# Patient Record
Sex: FEMALE | Race: BLACK OR AFRICAN AMERICAN | NOT HISPANIC OR LATINO | Employment: FULL TIME | URBAN - METROPOLITAN AREA
[De-identification: names, ages, dates, MRNs, and addresses within clinical notes are randomized per-mention and may not be internally consistent; named-entity substitution may affect disease eponyms.]

---

## 2023-06-06 LAB
ANION GAP IN SER/PLAS: 15 MMOL/L (ref 10–20)
APPEARANCE, URINE: CLEAR
BILIRUBIN, URINE: NEGATIVE
BLOOD, URINE: ABNORMAL
C REACTIVE PROTEIN (MG/L) IN SER/PLAS: 0.38 MG/DL
CALCIUM (MG/DL) IN SER/PLAS: 10.1 MG/DL (ref 8.6–10.6)
CARBON DIOXIDE, TOTAL (MMOL/L) IN SER/PLAS: 29 MMOL/L (ref 21–32)
CHLORIDE (MMOL/L) IN SER/PLAS: 99 MMOL/L (ref 98–107)
COLOR, URINE: YELLOW
CORTISOL (UG/DL) IN SERUM: 7.3 UG/DL (ref 2.5–20)
CREATININE (MG/DL) IN SER/PLAS: 0.6 MG/DL (ref 0.5–1.05)
GFR FEMALE: >90 ML/MIN/1.73M2
GLUCOSE (MG/DL) IN SER/PLAS: 106 MG/DL (ref 74–99)
GLUCOSE, URINE: NEGATIVE MG/DL
KETONES, URINE: NEGATIVE MG/DL
LEUKOCYTE ESTERASE, URINE: NEGATIVE
NITRITE, URINE: NEGATIVE
PH, URINE: 6 (ref 5–8)
POTASSIUM (MMOL/L) IN SER/PLAS: 3.7 MMOL/L (ref 3.5–5.3)
PROTEIN, URINE: NEGATIVE MG/DL
RBC, URINE: <1 /HPF (ref 0–5)
RHEUMATOID FACTOR (IU/ML) IN SERUM OR PLASMA: <10 IU/ML (ref 0–15)
SODIUM (MMOL/L) IN SER/PLAS: 139 MMOL/L (ref 136–145)
SPECIFIC GRAVITY, URINE: 1 (ref 1–1.03)
SQUAMOUS EPITHELIAL CELLS, URINE: 1 /HPF
UREA NITROGEN (MG/DL) IN SER/PLAS: 6 MG/DL (ref 6–23)
UROBILINOGEN, URINE: <2 MG/DL (ref 0–1.9)
WBC, URINE: 1 /HPF (ref 0–5)

## 2023-06-08 LAB
ALBUMIN ELP: 4.7 G/DL (ref 3.4–5)
ALPHA 1: 0.3 G/DL (ref 0.2–0.6)
ALPHA 2: 0.6 G/DL (ref 0.4–1.1)
BETA: 1.1 G/DL (ref 0.5–1.2)
GAMMA GLOBULIN: 1.1 G/DL (ref 0.5–1.4)
PATH REVIEW-SERUM PROTEIN ELECTROPHORESIS: NORMAL
PROTEIN ELECTROPHORESIS INTERPRETATION: NORMAL
PROTEIN TOTAL: 7.7 G/DL (ref 6.4–8.2)

## 2023-06-09 DIAGNOSIS — I10 HYPERTENSION, UNSPECIFIED TYPE: Primary | ICD-10-CM

## 2023-06-09 LAB — ANGIOTENSIN CONVERTING ENZYME: 34 U/L (ref 16–85)

## 2023-06-15 RX ORDER — DILTIAZEM HYDROCHLORIDE 240 MG/1
CAPSULE, EXTENDED RELEASE ORAL
Qty: 90 CAPSULE | Refills: 1 | Status: SHIPPED | OUTPATIENT
Start: 2023-06-15 | End: 2023-11-07 | Stop reason: SDUPTHER

## 2023-08-18 ENCOUNTER — OFFICE VISIT (OUTPATIENT)
Dept: PRIMARY CARE | Facility: CLINIC | Age: 53
End: 2023-08-18
Payer: COMMERCIAL

## 2023-08-18 VITALS
DIASTOLIC BLOOD PRESSURE: 92 MMHG | OXYGEN SATURATION: 97 % | TEMPERATURE: 97.6 F | RESPIRATION RATE: 15 BRPM | SYSTOLIC BLOOD PRESSURE: 154 MMHG | BODY MASS INDEX: 38.01 KG/M2 | HEIGHT: 66 IN | HEART RATE: 77 BPM | WEIGHT: 236.5 LBS

## 2023-08-18 DIAGNOSIS — I10 PRIMARY HYPERTENSION: ICD-10-CM

## 2023-08-18 DIAGNOSIS — H53.8 BLURRED VISION: Primary | ICD-10-CM

## 2023-08-18 DIAGNOSIS — Z12.31 ENCOUNTER FOR SCREENING MAMMOGRAM FOR BREAST CANCER: ICD-10-CM

## 2023-08-18 DIAGNOSIS — E66.01 CLASS 2 SEVERE OBESITY WITH SERIOUS COMORBIDITY AND BODY MASS INDEX (BMI) OF 38.0 TO 38.9 IN ADULT, UNSPECIFIED OBESITY TYPE (MULTI): ICD-10-CM

## 2023-08-18 DIAGNOSIS — R51.9 INTRACTABLE HEADACHE, UNSPECIFIED CHRONICITY PATTERN, UNSPECIFIED HEADACHE TYPE: ICD-10-CM

## 2023-08-18 DIAGNOSIS — Z12.11 ENCOUNTER FOR SCREENING FOR MALIGNANT NEOPLASM OF COLON: ICD-10-CM

## 2023-08-18 DIAGNOSIS — R53.83 FATIGUE, UNSPECIFIED TYPE: ICD-10-CM

## 2023-08-18 DIAGNOSIS — E55.9 VITAMIN D DEFICIENCY: ICD-10-CM

## 2023-08-18 DIAGNOSIS — F41.9 ANXIETY: ICD-10-CM

## 2023-08-18 DIAGNOSIS — H53.50 VISUAL COLOR CHANGES: ICD-10-CM

## 2023-08-18 DIAGNOSIS — R63.1 INCREASED THIRST: ICD-10-CM

## 2023-08-18 DIAGNOSIS — E53.9 VITAMIN B DEFICIENCY: ICD-10-CM

## 2023-08-18 PROBLEM — M25.531 PAIN IN BOTH WRISTS: Status: ACTIVE | Noted: 2023-08-18

## 2023-08-18 PROBLEM — G45.9 TIA (TRANSIENT ISCHEMIC ATTACK): Status: ACTIVE | Noted: 2023-08-18

## 2023-08-18 PROBLEM — R90.89 ABNORMAL BRAIN MRI: Status: ACTIVE | Noted: 2023-08-18

## 2023-08-18 PROBLEM — R44.8 FACIAL PRESSURE: Status: ACTIVE | Noted: 2023-08-18

## 2023-08-18 PROBLEM — R76.8 HSV-2 SEROPOSITIVE: Status: ACTIVE | Noted: 2023-08-18

## 2023-08-18 PROBLEM — F43.29 GRIEF REACTION WITH PROLONGED BEREAVEMENT: Status: ACTIVE | Noted: 2023-08-18

## 2023-08-18 PROBLEM — G89.29 CHRONIC PAIN OF BOTH SHOULDERS: Status: ACTIVE | Noted: 2023-08-18

## 2023-08-18 PROBLEM — M25.512 CHRONIC PAIN OF BOTH SHOULDERS: Status: ACTIVE | Noted: 2023-08-18

## 2023-08-18 PROBLEM — E66.9 OBESITY, CLASS I, BMI 30-34.9: Status: ACTIVE | Noted: 2019-04-04

## 2023-08-18 PROBLEM — D69.6 THROMBOCYTOPENIA (CMS-HCC): Status: ACTIVE | Noted: 2023-08-18

## 2023-08-18 PROBLEM — M25.562 CHRONIC PAIN OF BOTH KNEES: Status: ACTIVE | Noted: 2023-08-18

## 2023-08-18 PROBLEM — M25.572 CHRONIC PAIN OF BOTH ANKLES: Status: ACTIVE | Noted: 2023-08-18

## 2023-08-18 PROBLEM — E66.811 OBESITY, CLASS I, BMI 30-34.9: Status: ACTIVE | Noted: 2019-04-04

## 2023-08-18 PROBLEM — J31.0 CHRONIC RHINITIS: Status: ACTIVE | Noted: 2023-08-18

## 2023-08-18 PROBLEM — R19.7 DIARRHEA: Status: ACTIVE | Noted: 2023-08-18

## 2023-08-18 PROBLEM — E66.812 OBESITY, CLASS II, BMI 35-39.9: Status: ACTIVE | Noted: 2020-01-14

## 2023-08-18 PROBLEM — R61 NIGHT SWEAT: Status: ACTIVE | Noted: 2023-08-18

## 2023-08-18 PROBLEM — R09.81 SINUS CONGESTION: Status: ACTIVE | Noted: 2023-08-18

## 2023-08-18 PROBLEM — M79.642 PAIN IN BOTH HANDS: Status: ACTIVE | Noted: 2023-08-18

## 2023-08-18 PROBLEM — M25.561 CHRONIC PAIN OF BOTH KNEES: Status: ACTIVE | Noted: 2023-08-18

## 2023-08-18 PROBLEM — M25.50 MULTIPLE JOINT PAIN: Status: ACTIVE | Noted: 2023-08-18

## 2023-08-18 PROBLEM — M25.511 CHRONIC PAIN OF BOTH SHOULDERS: Status: ACTIVE | Noted: 2023-08-18

## 2023-08-18 PROBLEM — D75.A G6PD DEFICIENCY: Status: ACTIVE | Noted: 2023-08-18

## 2023-08-18 PROBLEM — M25.571 CHRONIC PAIN OF BOTH ANKLES: Status: ACTIVE | Noted: 2023-08-18

## 2023-08-18 PROBLEM — M25.532 PAIN IN BOTH WRISTS: Status: ACTIVE | Noted: 2023-08-18

## 2023-08-18 PROBLEM — E66.9 OBESITY, CLASS II, BMI 35-39.9: Status: ACTIVE | Noted: 2020-01-14

## 2023-08-18 PROBLEM — J34.3 HYPERTROPHY OF BOTH INFERIOR NASAL TURBINATES: Status: ACTIVE | Noted: 2023-08-18

## 2023-08-18 PROBLEM — M54.9 BACK PAIN: Status: ACTIVE | Noted: 2023-08-18

## 2023-08-18 PROBLEM — R20.2 PARESTHESIA: Status: ACTIVE | Noted: 2023-08-18

## 2023-08-18 PROBLEM — G89.29 CHRONIC PAIN OF BOTH ANKLES: Status: ACTIVE | Noted: 2023-08-18

## 2023-08-18 PROBLEM — F34.1 DYSTHYMIA: Status: ACTIVE | Noted: 2017-11-10

## 2023-08-18 PROBLEM — M79.641 PAIN IN BOTH HANDS: Status: ACTIVE | Noted: 2023-08-18

## 2023-08-18 PROBLEM — R73.9 ACUTE HYPERGLYCEMIA: Status: ACTIVE | Noted: 2023-08-18

## 2023-08-18 PROBLEM — G89.29 CHRONIC PAIN OF BOTH KNEES: Status: ACTIVE | Noted: 2023-08-18

## 2023-08-18 PROCEDURE — 3077F SYST BP >= 140 MM HG: CPT | Performed by: FAMILY MEDICINE

## 2023-08-18 PROCEDURE — 3080F DIAST BP >= 90 MM HG: CPT | Performed by: FAMILY MEDICINE

## 2023-08-18 PROCEDURE — 99215 OFFICE O/P EST HI 40 MIN: CPT | Performed by: FAMILY MEDICINE

## 2023-08-18 PROCEDURE — 3008F BODY MASS INDEX DOCD: CPT | Performed by: FAMILY MEDICINE

## 2023-08-18 PROCEDURE — 1036F TOBACCO NON-USER: CPT | Performed by: FAMILY MEDICINE

## 2023-08-18 RX ORDER — CHOLECALCIFEROL (VITAMIN D3) 25 MCG
1 TABLET ORAL DAILY
COMMUNITY
Start: 2022-10-06 | End: 2024-05-30 | Stop reason: ALTCHOICE

## 2023-08-18 RX ORDER — HYDROCHLOROTHIAZIDE 25 MG/1
1 TABLET ORAL DAILY
COMMUNITY
Start: 2021-11-11 | End: 2023-11-07 | Stop reason: SDUPTHER

## 2023-08-18 RX ORDER — CYANOCOBALAMIN 1000 UG/ML
INJECTION, SOLUTION INTRAMUSCULAR; SUBCUTANEOUS
COMMUNITY

## 2023-08-18 RX ORDER — LOSARTAN POTASSIUM 50 MG/1
50 TABLET ORAL DAILY
Qty: 30 TABLET | Refills: 0 | Status: SHIPPED | OUTPATIENT
Start: 2023-08-18 | End: 2023-09-15

## 2023-08-18 RX ORDER — AZELASTINE 1 MG/ML
2 SPRAY, METERED NASAL 2 TIMES DAILY
COMMUNITY
Start: 2022-07-13

## 2023-08-18 RX ORDER — DULOXETIN HYDROCHLORIDE 30 MG/1
30 CAPSULE, DELAYED RELEASE ORAL DAILY
Qty: 30 CAPSULE | Refills: 5 | Status: SHIPPED | OUTPATIENT
Start: 2023-08-18 | End: 2024-02-14

## 2023-08-18 ASSESSMENT — PATIENT HEALTH QUESTIONNAIRE - PHQ9
SUM OF ALL RESPONSES TO PHQ9 QUESTIONS 1 AND 2: 0
1. LITTLE INTEREST OR PLEASURE IN DOING THINGS: NOT AT ALL
2. FEELING DOWN, DEPRESSED OR HOPELESS: NOT AT ALL

## 2023-08-18 ASSESSMENT — LIFESTYLE VARIABLES: HOW MANY STANDARD DRINKS CONTAINING ALCOHOL DO YOU HAVE ON A TYPICAL DAY: PATIENT DOES NOT DRINK

## 2023-08-18 NOTE — PROGRESS NOTES
"Subjective   Patient ID: Nell Liao is a 53 y.o. female who presents for Follow-up (Pt states she has been experiencing dizziness, tightness in head,blurred vision.).    HPI     Pt with multiple complaints below:  Headaches as a \"tightness\" of head at the \"top\" of the head  States \"I don't feel like my self\"  She has been with poor vision that is blurred and seeing colors of a \"blue strip\"- seen by eye team anthony this year-  was w/o these symptoms at the time  Also states \"trying to find the words to say\" while talking at times  Symptoms started 6 months+ ago and has worsened  Also w/+ Dry mouth    HTN  BP NOT  at goal today in office.  Using medications without issues.  States BP at home has been 200's-180's/ 's since \"forever\"    anxiety  She does have some anxiety- states has worsened some- colin thinking about her health  Was seen by psychology- but does not like her counselor and would like to try another  Not on meds- but thinking may need something to help         Social  Medical claims  Kids- 2 ( 29/28)  Divorce  Likes/ hobbies- sotero,      Review of Systems  All systems reviewed and neg if not noted in the HPI above       Objective   BP (!) 154/92 (Patient Position: Sitting)   Pulse 77   Temp 36.4 °C (97.6 °F)   Resp 15   Ht 1.676 m (5' 6\")   Wt 107 kg (236 lb 8 oz)   SpO2 97%   BMI 38.17 kg/m²     Physical Exam  Neurological:      Cranial Nerves: Cranial nerves 2-12 are intact. No cranial nerve deficit or facial asymmetry.      Sensory: Sensation is intact. No sensory deficit.      Motor: Motor function is intact. No weakness, tremor or abnormal muscle tone.      Coordination: Coordination is intact. Finger-Nose-Finger Test normal.       Pleasant  Eyes: conjunctiva non-icteric and eye lids are without obvious rash or drooping. Pupils are symmetric.   Ears, Nose, Mouth, and Throat: External ears and nose appear to be without deformity or rash. No lesions or masses noted. Hearing is " grossly intact.   CV: RRR, no murmur  Carotids: no bruits  Pulm:CTA B/L  LE: no edema  Psychiatric: Alert, orientation to person, place, and time. Recent/remote memory as evidenced through face-to-face interaction and discussion appear grossly intact. Mood and affect are normal.        Assessment/Plan   Problem List Items Addressed This Visit       Anxiety    Relevant Medications    DULoxetine (Cymbalta) 30 mg DR capsule    Other Relevant Orders    Comprehensive Metabolic Panel    CBC and Auto Differential    TSH with reflex to Free T4 if abnormal    Vitamin B12    Referral to Psychology    Headache    Relevant Medications    DULoxetine (Cymbalta) 30 mg DR capsule    Other Relevant Orders    Sedimentation Rate    C-reactive protein    Referral to Neurology    Referral to Ophthalmology    Comprehensive Metabolic Panel    CBC and Auto Differential    TSH with reflex to Free T4 if abnormal    Hypertension    Relevant Medications    losartan (Cozaar) 50 mg tablet    Vitamin B deficiency    Relevant Orders    Comprehensive Metabolic Panel    CBC and Auto Differential    TSH with reflex to Free T4 if abnormal    Vitamin D deficiency    Relevant Orders    Comprehensive Metabolic Panel    CBC and Auto Differential    TSH with reflex to Free T4 if abnormal     Other Visit Diagnoses       Blurred vision    -  Primary    Relevant Orders    Sedimentation Rate    C-reactive protein    Referral to Neurology    Referral to Ophthalmology    Comprehensive Metabolic Panel    CBC and Auto Differential    TSH with reflex to Free T4 if abnormal    Visual color changes        Relevant Orders    Sedimentation Rate    C-reactive protein    Referral to Neurology    Referral to Ophthalmology    Comprehensive Metabolic Panel    CBC and Auto Differential    TSH with reflex to Free T4 if abnormal    Fatigue, unspecified type        Relevant Orders    Comprehensive Metabolic Panel    CBC and Auto Differential    TSH with reflex to Free T4 if  abnormal    Vitamin D 25-Hydroxy,Total    Encounter for screening mammogram for breast cancer        Relevant Orders    Comprehensive Metabolic Panel    CBC and Auto Differential    TSH with reflex to Free T4 if abnormal    Vitamin B12    BI mammo bilateral screening tomosynthesis    Encounter for screening for malignant neoplasm of colon        Relevant Orders    Comprehensive Metabolic Panel    CBC and Auto Differential    TSH with reflex to Free T4 if abnormal    Colonoscopy    Increased thirst        Relevant Orders    Hemoglobin A1c    Comprehensive Metabolic Panel    CBC and Auto Differential    TSH with reflex to Free T4 if abnormal    Class 2 severe obesity with serious comorbidity and body mass index (BMI) of 38.0 to 38.9 in adult, unspecified obesity type (CMS/HCC)        Relevant Orders    Lipid Panel    TSH with reflex to Free T4 if abnormal               Please follow up in 2wks bp check nurse clinic and 1-2 months (7:40) or as needed.

## 2023-08-18 NOTE — PATIENT INSTRUCTIONS
"Headache/ change in vision/ fatigue  - Labs  - Neuro referral  - Neuro/ ophtho  - Starting on duloxetine- use 30mg X 2 wks, then increase to 60mg thereafter  ----- pls start after our BP check in 2wk- to make sure your no side effects    Anxiety  - Starting on duloxetine- use 30mg X 2 wks, then increase to 60mg thereafter  ----- pls start after our BP check in 2wk- to make sure your no side effects  Placed a referral to see the Psychology team.  Please check out [email or web address] to find your very own counselor you would feel comfortable with.  - Suggested watching \"The Secret\" on [employer] (or on [employer] it's called: \"The Secret full movie English\")  This is a motivational video that talks about the 'Law of Attraction' (positive thinking and speaking)- a principal the can redirect how you think and help you in your daily life!     HTN  - Your blood pressure is not at goal today- goal is less than 130/80  - Add Losartan 25mg  - Weight loss can help lower your bp!  Work on a healthy whole food diet and add at least 30min of exercise 5 days per week  - Work on a low salt diet     Exercise- light  Healthy diet      Mammogram and colonoscopy ordered    Please follow up in 2wks bp check nurse clinic and 1-2 months (7:40) or as needed.       ** If labs or imaging ordered at today's visit, all the non-urgent results will be discussed at your next visit    If you have been referred for a special test or to a specialist please call  [phone] to schedule an appointment.  If you have any further questions, or if develop new or worsened symptoms, please give our office a call at [phone]. n      "

## 2023-09-11 ENCOUNTER — APPOINTMENT (OUTPATIENT)
Dept: PRIMARY CARE | Facility: CLINIC | Age: 53
End: 2023-09-11
Payer: COMMERCIAL

## 2023-09-28 DIAGNOSIS — I10 PRIMARY HYPERTENSION: ICD-10-CM

## 2023-10-02 RX ORDER — LOSARTAN POTASSIUM 50 MG/1
50 TABLET ORAL DAILY
Qty: 30 TABLET | Refills: 0 | Status: SHIPPED | OUTPATIENT
Start: 2023-10-02 | End: 2023-11-07 | Stop reason: SDUPTHER

## 2023-11-07 ENCOUNTER — OFFICE VISIT (OUTPATIENT)
Dept: PRIMARY CARE | Facility: CLINIC | Age: 53
End: 2023-11-07
Payer: COMMERCIAL

## 2023-11-07 VITALS
SYSTOLIC BLOOD PRESSURE: 132 MMHG | TEMPERATURE: 95 F | HEART RATE: 78 BPM | DIASTOLIC BLOOD PRESSURE: 70 MMHG | RESPIRATION RATE: 17 BRPM | OXYGEN SATURATION: 99 % | WEIGHT: 238.1 LBS | HEIGHT: 66 IN | BODY MASS INDEX: 38.27 KG/M2

## 2023-11-07 DIAGNOSIS — I10 PRIMARY HYPERTENSION: ICD-10-CM

## 2023-11-07 DIAGNOSIS — I10 HYPERTENSION, UNSPECIFIED TYPE: Primary | ICD-10-CM

## 2023-11-07 DIAGNOSIS — Z23 NEED FOR VACCINATION: ICD-10-CM

## 2023-11-07 PROBLEM — E66.9 OBESITY, CLASS II, BMI 35-39.9: Status: RESOLVED | Noted: 2020-01-14 | Resolved: 2023-11-07

## 2023-11-07 PROBLEM — E66.811 OBESITY, CLASS I, BMI 30-34.9: Status: RESOLVED | Noted: 2019-04-04 | Resolved: 2023-11-07

## 2023-11-07 PROBLEM — R09.81 SINUS CONGESTION: Status: RESOLVED | Noted: 2023-08-18 | Resolved: 2023-11-07

## 2023-11-07 PROBLEM — E66.812 OBESITY, CLASS II, BMI 35-39.9: Status: RESOLVED | Noted: 2020-01-14 | Resolved: 2023-11-07

## 2023-11-07 PROBLEM — E66.9 OBESITY, CLASS I, BMI 30-34.9: Status: RESOLVED | Noted: 2019-04-04 | Resolved: 2023-11-07

## 2023-11-07 PROCEDURE — 99213 OFFICE O/P EST LOW 20 MIN: CPT | Performed by: FAMILY MEDICINE

## 2023-11-07 PROCEDURE — 3078F DIAST BP <80 MM HG: CPT | Performed by: FAMILY MEDICINE

## 2023-11-07 PROCEDURE — 90471 IMMUNIZATION ADMIN: CPT | Performed by: FAMILY MEDICINE

## 2023-11-07 PROCEDURE — 1036F TOBACCO NON-USER: CPT | Performed by: FAMILY MEDICINE

## 2023-11-07 PROCEDURE — 90750 HZV VACC RECOMBINANT IM: CPT | Performed by: FAMILY MEDICINE

## 2023-11-07 PROCEDURE — 3075F SYST BP GE 130 - 139MM HG: CPT | Performed by: FAMILY MEDICINE

## 2023-11-07 PROCEDURE — 3008F BODY MASS INDEX DOCD: CPT | Performed by: FAMILY MEDICINE

## 2023-11-07 RX ORDER — DILTIAZEM HYDROCHLORIDE 240 MG/1
240 CAPSULE, EXTENDED RELEASE ORAL DAILY
Qty: 90 CAPSULE | Refills: 3 | Status: SHIPPED | OUTPATIENT
Start: 2023-11-07

## 2023-11-07 RX ORDER — LOSARTAN POTASSIUM 50 MG/1
50 TABLET ORAL DAILY
Qty: 90 TABLET | Refills: 3 | Status: SHIPPED | OUTPATIENT
Start: 2023-11-07

## 2023-11-07 RX ORDER — HYDROCHLOROTHIAZIDE 25 MG/1
25 TABLET ORAL DAILY
Qty: 90 TABLET | Refills: 3 | Status: SHIPPED | OUTPATIENT
Start: 2023-11-07

## 2023-11-07 NOTE — PATIENT INSTRUCTIONS
Shingrix today    HTN  - Your blood pressure is at goal today- goal is less than 130/80  - Continue your current medications  - Weight loss can help lower your bp!  Work on a healthy whole food diet and add at least 30min of exercise 5 days per week  - Work on a low salt diet    Colonoscopy and mammogram already scheduled    Please follow up in 8 wks for shingrix #2 and in 6months for Wellness or as needed.     *If labs or imaging ordered at today's visit, all the non-urgent results will be discussed at your next visit    If you have been referred for a special test or to a specialist please call  [phone] to schedule an appointment.  If you have any further questions, or if develop new or worsened symptoms, please give our office a call at [phone].

## 2023-11-07 NOTE — PROGRESS NOTES
"Subjective   Patient ID: Nell Liao is a 53 y.o. female who presents for Follow-up (Pt is here for HTN fuv.).    HPI     HTN  BP at goal today in office.  Using medications without issues.  Denies CP, SOB, palpitations, change in vision, dizziness, N/V.      Review of Systems  All systems reviewed and neg if not noted in the HPI above     Objective   /70 (Patient Position: Sitting)   Pulse 78   Temp 35 °C (95 °F)   Resp 17   Ht 1.676 m (5' 6\")   Wt 108 kg (238 lb 1.6 oz)   SpO2 99%   BMI 38.43 kg/m²     Physical Exam  Pleasant  Eyes: conjunctiva non-icteric and eye lids are without obvious rash or drooping. Pupils are symmetric.   Ears, Nose, Mouth, and Throat: External ears and nose appear to be without deformity or rash. No lesions or masses noted. Hearing is grossly intact.   CV: RRR, no murmur  Pulm:CTA B/L  LE: no edema  Psychiatric: Alert, orientation to person, place, and time. Recent/remote memory as evidenced through face-to-face interaction and discussion appear grossly intact. Mood and affect are normal.      Assessment/Plan   Problem List Items Addressed This Visit             ICD-10-CM    Hypertension I10    Relevant Medications    losartan (Cozaar) 50 mg tablet    dilTIAZem XR (DILT-XR) 240 mg 24 hr capsule    hydroCHLOROthiazide (HYDRODiuril) 25 mg tablet   - Your blood pressure is at goal today- goal is less than 130/80  - Continue your current medications  - Weight loss can help lower your bp!  Work on a healthy whole food diet and add at least 30min of exercise 5 days per week  - Work on a low salt diet           Please follow up in 8 wks for shingrix #2 and in 6months for Wellness or as needed.     "

## 2023-11-14 ENCOUNTER — APPOINTMENT (OUTPATIENT)
Dept: NEUROLOGY | Facility: HOSPITAL | Age: 53
End: 2023-11-14
Payer: COMMERCIAL

## 2023-12-26 ENCOUNTER — APPOINTMENT (OUTPATIENT)
Dept: RADIOLOGY | Facility: CLINIC | Age: 53
End: 2023-12-26
Payer: COMMERCIAL

## 2024-01-09 ENCOUNTER — APPOINTMENT (OUTPATIENT)
Dept: RHEUMATOLOGY | Facility: CLINIC | Age: 54
End: 2024-01-09
Payer: COMMERCIAL

## 2024-02-07 ENCOUNTER — LAB (OUTPATIENT)
Dept: LAB | Facility: LAB | Age: 54
End: 2024-02-07
Payer: COMMERCIAL

## 2024-02-07 DIAGNOSIS — E53.9 VITAMIN B DEFICIENCY: ICD-10-CM

## 2024-02-07 DIAGNOSIS — R51.9 INTRACTABLE HEADACHE, UNSPECIFIED CHRONICITY PATTERN, UNSPECIFIED HEADACHE TYPE: ICD-10-CM

## 2024-02-07 DIAGNOSIS — Z12.11 ENCOUNTER FOR SCREENING FOR MALIGNANT NEOPLASM OF COLON: ICD-10-CM

## 2024-02-07 DIAGNOSIS — H53.50 VISUAL COLOR CHANGES: ICD-10-CM

## 2024-02-07 DIAGNOSIS — E66.01 CLASS 2 SEVERE OBESITY WITH SERIOUS COMORBIDITY AND BODY MASS INDEX (BMI) OF 38.0 TO 38.9 IN ADULT, UNSPECIFIED OBESITY TYPE (MULTI): ICD-10-CM

## 2024-02-07 DIAGNOSIS — E55.9 VITAMIN D DEFICIENCY: ICD-10-CM

## 2024-02-07 DIAGNOSIS — F41.9 ANXIETY: ICD-10-CM

## 2024-02-07 DIAGNOSIS — Z12.31 ENCOUNTER FOR SCREENING MAMMOGRAM FOR BREAST CANCER: ICD-10-CM

## 2024-02-07 DIAGNOSIS — R63.1 INCREASED THIRST: ICD-10-CM

## 2024-02-07 DIAGNOSIS — H53.8 BLURRED VISION: ICD-10-CM

## 2024-02-07 DIAGNOSIS — R53.83 FATIGUE, UNSPECIFIED TYPE: ICD-10-CM

## 2024-02-07 LAB
25(OH)D3 SERPL-MCNC: 19 NG/ML (ref 30–100)
ALBUMIN SERPL BCP-MCNC: 4.4 G/DL (ref 3.4–5)
ALP SERPL-CCNC: 52 U/L (ref 33–110)
ALT SERPL W P-5'-P-CCNC: 18 U/L (ref 7–45)
ANION GAP SERPL CALC-SCNC: 15 MMOL/L (ref 10–20)
AST SERPL W P-5'-P-CCNC: 16 U/L (ref 9–39)
BASOPHILS # BLD AUTO: 0.09 X10*3/UL (ref 0–0.1)
BASOPHILS NFR BLD AUTO: 2.2 %
BILIRUB SERPL-MCNC: 0.9 MG/DL (ref 0–1.2)
BUN SERPL-MCNC: 10 MG/DL (ref 6–23)
CALCIUM SERPL-MCNC: 9.8 MG/DL (ref 8.6–10.6)
CHLORIDE SERPL-SCNC: 100 MMOL/L (ref 98–107)
CHOLEST SERPL-MCNC: 219 MG/DL (ref 0–199)
CHOLESTEROL/HDL RATIO: 4.9
CO2 SERPL-SCNC: 29 MMOL/L (ref 21–32)
CREAT SERPL-MCNC: 0.68 MG/DL (ref 0.5–1.05)
CRP SERPL-MCNC: 0.33 MG/DL
EGFRCR SERPLBLD CKD-EPI 2021: >90 ML/MIN/1.73M*2
EOSINOPHIL # BLD AUTO: 0.06 X10*3/UL (ref 0–0.7)
EOSINOPHIL NFR BLD AUTO: 1.4 %
ERYTHROCYTE [DISTWIDTH] IN BLOOD BY AUTOMATED COUNT: 12.7 % (ref 11.5–14.5)
ERYTHROCYTE [SEDIMENTATION RATE] IN BLOOD BY WESTERGREN METHOD: 12 MM/H (ref 0–30)
EST. AVERAGE GLUCOSE BLD GHB EST-MCNC: 97 MG/DL
GLUCOSE SERPL-MCNC: 100 MG/DL (ref 74–99)
HBA1C MFR BLD: 5 %
HCT VFR BLD AUTO: 44 % (ref 36–46)
HDLC SERPL-MCNC: 44.9 MG/DL
HGB BLD-MCNC: 15 G/DL (ref 12–16)
IMM GRANULOCYTES # BLD AUTO: 0.02 X10*3/UL (ref 0–0.7)
IMM GRANULOCYTES NFR BLD AUTO: 0.5 % (ref 0–0.9)
LDLC SERPL CALC-MCNC: 117 MG/DL
LYMPHOCYTES # BLD AUTO: 2.2 X10*3/UL (ref 1.2–4.8)
LYMPHOCYTES NFR BLD AUTO: 53.1 %
MCH RBC QN AUTO: 32.8 PG (ref 26–34)
MCHC RBC AUTO-ENTMCNC: 34.1 G/DL (ref 32–36)
MCV RBC AUTO: 96 FL (ref 80–100)
MONOCYTES # BLD AUTO: 0.34 X10*3/UL (ref 0.1–1)
MONOCYTES NFR BLD AUTO: 8.2 %
NEUTROPHILS # BLD AUTO: 1.43 X10*3/UL (ref 1.2–7.7)
NEUTROPHILS NFR BLD AUTO: 34.6 %
NON HDL CHOLESTEROL: 174 MG/DL (ref 0–149)
NRBC BLD-RTO: 0 /100 WBCS (ref 0–0)
PLATELET # BLD AUTO: 136 X10*3/UL (ref 150–450)
POTASSIUM SERPL-SCNC: 4 MMOL/L (ref 3.5–5.3)
PROT SERPL-MCNC: 7.2 G/DL (ref 6.4–8.2)
RBC # BLD AUTO: 4.58 X10*6/UL (ref 4–5.2)
SODIUM SERPL-SCNC: 140 MMOL/L (ref 136–145)
TRIGL SERPL-MCNC: 287 MG/DL (ref 0–149)
TSH SERPL-ACNC: 1.52 MIU/L (ref 0.44–3.98)
VIT B12 SERPL-MCNC: 345 PG/ML (ref 211–911)
VLDL: 57 MG/DL (ref 0–40)
WBC # BLD AUTO: 4.1 X10*3/UL (ref 4.4–11.3)

## 2024-02-07 PROCEDURE — 80061 LIPID PANEL: CPT

## 2024-02-07 PROCEDURE — 86140 C-REACTIVE PROTEIN: CPT

## 2024-02-07 PROCEDURE — 85025 COMPLETE CBC W/AUTO DIFF WBC: CPT

## 2024-02-07 PROCEDURE — 82306 VITAMIN D 25 HYDROXY: CPT

## 2024-02-07 PROCEDURE — 80053 COMPREHEN METABOLIC PANEL: CPT

## 2024-02-07 PROCEDURE — 85652 RBC SED RATE AUTOMATED: CPT

## 2024-02-07 PROCEDURE — 82607 VITAMIN B-12: CPT

## 2024-02-07 PROCEDURE — 83036 HEMOGLOBIN GLYCOSYLATED A1C: CPT

## 2024-02-07 PROCEDURE — 36415 COLL VENOUS BLD VENIPUNCTURE: CPT

## 2024-02-07 PROCEDURE — 84443 ASSAY THYROID STIM HORMONE: CPT

## 2024-05-08 ENCOUNTER — OFFICE VISIT (OUTPATIENT)
Dept: PRIMARY CARE | Facility: CLINIC | Age: 54
End: 2024-05-08
Payer: COMMERCIAL

## 2024-05-08 VITALS
DIASTOLIC BLOOD PRESSURE: 82 MMHG | OXYGEN SATURATION: 98 % | WEIGHT: 247 LBS | BODY MASS INDEX: 39.7 KG/M2 | HEIGHT: 66 IN | HEART RATE: 77 BPM | TEMPERATURE: 97 F | RESPIRATION RATE: 15 BRPM | SYSTOLIC BLOOD PRESSURE: 138 MMHG

## 2024-05-08 DIAGNOSIS — Z12.11 ENCOUNTER FOR SCREENING FOR MALIGNANT NEOPLASM OF COLON: ICD-10-CM

## 2024-05-08 DIAGNOSIS — E78.5 HYPERLIPIDEMIA, UNSPECIFIED HYPERLIPIDEMIA TYPE: ICD-10-CM

## 2024-05-08 DIAGNOSIS — Z12.31 ENCOUNTER FOR SCREENING MAMMOGRAM FOR BREAST CANCER: ICD-10-CM

## 2024-05-08 DIAGNOSIS — R40.0 DAYTIME SLEEPINESS: ICD-10-CM

## 2024-05-08 DIAGNOSIS — E66.01 CLASS 2 SEVERE OBESITY WITH SERIOUS COMORBIDITY AND BODY MASS INDEX (BMI) OF 39.0 TO 39.9 IN ADULT, UNSPECIFIED OBESITY TYPE (MULTI): ICD-10-CM

## 2024-05-08 DIAGNOSIS — Z00.00 WELLNESS EXAMINATION: Primary | ICD-10-CM

## 2024-05-08 DIAGNOSIS — I10 HYPERTENSION, UNSPECIFIED TYPE: ICD-10-CM

## 2024-05-08 PROBLEM — R20.2 PARESTHESIA: Status: RESOLVED | Noted: 2023-08-18 | Resolved: 2024-05-08

## 2024-05-08 PROBLEM — M25.539 PAIN IN WRIST: Status: ACTIVE | Noted: 2024-05-08

## 2024-05-08 PROBLEM — R63.4 ABNORMAL WEIGHT LOSS: Status: ACTIVE | Noted: 2024-05-08

## 2024-05-08 PROBLEM — J01.90 ACUTE SINUSITIS: Status: ACTIVE | Noted: 2024-05-08

## 2024-05-08 PROBLEM — S89.90XA KNEE INJURY: Status: ACTIVE | Noted: 2024-05-08

## 2024-05-08 PROBLEM — J01.90 ACUTE SINUSITIS: Status: RESOLVED | Noted: 2024-05-08 | Resolved: 2024-05-08

## 2024-05-08 PROBLEM — M79.643 PAIN OF HAND: Status: ACTIVE | Noted: 2024-05-08

## 2024-05-08 PROBLEM — G45.9 TIA (TRANSIENT ISCHEMIC ATTACK): Status: RESOLVED | Noted: 2023-08-18 | Resolved: 2024-05-08

## 2024-05-08 PROBLEM — M25.519 SHOULDER PAIN: Status: ACTIVE | Noted: 2024-05-08

## 2024-05-08 PROCEDURE — 3079F DIAST BP 80-89 MM HG: CPT | Performed by: FAMILY MEDICINE

## 2024-05-08 PROCEDURE — 1036F TOBACCO NON-USER: CPT | Performed by: FAMILY MEDICINE

## 2024-05-08 PROCEDURE — 3075F SYST BP GE 130 - 139MM HG: CPT | Performed by: FAMILY MEDICINE

## 2024-05-08 PROCEDURE — 99213 OFFICE O/P EST LOW 20 MIN: CPT | Performed by: FAMILY MEDICINE

## 2024-05-08 PROCEDURE — 99396 PREV VISIT EST AGE 40-64: CPT | Performed by: FAMILY MEDICINE

## 2024-05-08 PROCEDURE — 3008F BODY MASS INDEX DOCD: CPT | Performed by: FAMILY MEDICINE

## 2024-05-08 ASSESSMENT — PATIENT HEALTH QUESTIONNAIRE - PHQ9
2. FEELING DOWN, DEPRESSED OR HOPELESS: NOT AT ALL
SUM OF ALL RESPONSES TO PHQ9 QUESTIONS 1 AND 2: 0
1. LITTLE INTEREST OR PLEASURE IN DOING THINGS: NOT AT ALL

## 2024-05-08 NOTE — PROGRESS NOTES
"Subjective   Patient ID: Nell Liao is a 53 y.o. female who presents for Annual Exam.    HPI     Here for a physical  Does not want a chaperone.     Other concerns/issues/followup:  1 -Has been fatigue- no energy,needing a nap daily  worsened 2-3months ago- present for years now    2 -  question if with with TIA- noted in 2021 after loss of vision brief on one eye- MRI brain did not show stroke- sent o neurology for abnml MRI  7/31/21  IMPRESSION:  No acute infarct, recent hemorrhage, or mass effect.     Nonspecific mildly expanded sella with borderline ectasia of the  optic nerve sheaths. If there is clinical concern for intracranial  hypertension, consider lumbar puncture for further evaluation.      In 2020 was with ct HEAD was nml as well  No other changes in vision.  Not on statin          3- HTN  BP at goal today in office.  Using medications without issues.  Denies CP, SOB, palpitations, change in vision, dizziness, N/V.        PMSFH was reviewed & updated.       Social:  Medical claims  Kids- 2 ( 29/28)  Divorce  Likes/ hobbies- sotero     ETOH - occ  Drugs - none  Tobacco - 2 Xmonth    Review of Systems  All systems reviewed and neg if not noted in the HPI above       Objective   /82 (Patient Position: Sitting)   Pulse 77   Temp 36.1 °C (97 °F)   Resp 15   Ht 1.676 m (5' 6\")   Wt 112 kg (247 lb)   SpO2 98%   BMI 39.87 kg/m²     Physical Exam  Pleasant  Eyes: conjunctiva non-icteric and eye lids are without obvious rash or drooping. Pupils are symmetric.   Ears, Nose, Mouth, and Throat: External ears and nose appear to be without deformity or rash. No lesions or masses noted. Hearing is grossly intact.   CV: RRR, no murmur  Carotids: no bruits  Pulm:CTA B/L  Abd: soft, NTTP, + BS  LE: no edema  Psychiatric: Alert, orientation to person, place, and time. Recent/remote memory as evidenced through face-to-face interaction and discussion appear grossly intact. Mood and affect are " normal.       Assessment/Plan   Problem List Items Addressed This Visit             ICD-10-CM    Hypertension I10    Relevant Orders    CT cardiac scoring wo IV contrast     Other Visit Diagnoses         Codes    Wellness examination    -  Primary Z00.00    Relevant Orders    CBC and Auto Differential    Comprehensive Metabolic Panel    TSH with reflex to Free T4 if abnormal    Vitamin D 25-Hydroxy,Total (for eval of Vitamin D levels)    Vitamin B12    Hemoglobin A1C    Class 2 severe obesity with serious comorbidity and body mass index (BMI) of 39.0 to 39.9 in adult, unspecified obesity type (Multi)      Continue to work on healthy diet and exercise  We encourage all patients to engage in exercise 150 minutes per week   Adults 18 and older, activity during each week - if you are able:    Engage in at least 150 minutes of moderate or vigorous exercise per week   If you are able- Engage in muscle strengthening activity on 2 or more days per week   E66.01, Z68.39    Relevant Orders    Referral to Nutrition Services    Daytime sleepiness     R40.0    Relevant Orders    CBC and Auto Differential    Comprehensive Metabolic Panel    TSH with reflex to Free T4 if abnormal    Vitamin D 25-Hydroxy,Total (for eval of Vitamin D levels)    Vitamin B12    Hemoglobin A1C    Referral to Adult Sleep Medicine    Hyperlipidemia, unspecified hyperlipidemia type     E78.5    Relevant Orders    Follow Up In Advanced Primary Care - Pharmacy    Checking CT cardiac scoring wo IV contrast  - if CT with elevation will start on statin and ASA due to possible TIA hx    Encounter for screening for malignant neoplasm of colon     Z12.11    Relevant Orders    Colonoscopy Screening; Average Risk Patient    Encounter for screening mammogram for breast cancer     Z12.31    Relevant Orders    BI mammo bilateral screening tomosynthesis              Please follow up in 6months HTN or as needed.

## 2024-05-08 NOTE — PATIENT INSTRUCTIONS
Fatigue  - labs  - sleep medicine referral      Referral for nutrition  Continue to work on healthy diet and exercise  We encourage all patients to engage in exercise 150 minutes per week   Adults 18 and older, activity during each week - if you are able:    Engage in at least 150 minutes of moderate or vigorous exercise per week   If you are able- Engage in muscle strengthening activity on 2 or more days per week    Be on the look out for a call from the [facility] pharm    Ordered ct CALCIUM       ----- Have your daughter  check out [email or web address] to find your very own counselor you would feel comfortable with.        Please follow up in 6months HTN or as needed.       ** If labs or imaging ordered at today's visit, all the non-urgent results will be discussed at your next visit    If you have been referred for a special test or to a specialist please call  [phone] to schedule an appointment.  If you have any further questions, or if develop new or worsened symptoms, please give our office a call at [phone].

## 2024-05-21 DIAGNOSIS — Z12.11 SPECIAL SCREENING FOR MALIGNANT NEOPLASMS, COLON: ICD-10-CM

## 2024-05-21 RX ORDER — POLYETHYLENE GLYCOL 3350, SODIUM SULFATE ANHYDROUS, SODIUM BICARBONATE, SODIUM CHLORIDE, POTASSIUM CHLORIDE 236; 22.74; 6.74; 5.86; 2.97 G/4L; G/4L; G/4L; G/4L; G/4L
POWDER, FOR SOLUTION ORAL
Qty: 4000 ML | Refills: 0 | Status: SHIPPED | OUTPATIENT
Start: 2024-05-21

## 2024-05-24 ENCOUNTER — APPOINTMENT (OUTPATIENT)
Dept: RHEUMATOLOGY | Facility: CLINIC | Age: 54
End: 2024-05-24
Payer: COMMERCIAL

## 2024-05-24 NOTE — PROGRESS NOTES
She was last seen in room with nausea department 2023.  10/6/2022  History of Present IllnessInformants: Patient and EMR.  PP: 52 year-old female with history of vitamin D deficiency, vitamin B12 deficiency, hypertension, remote right knee injury.  CC: Pain and stiffness and fingers and knees.  [place]: She has been having pain in her knees for the past 1 year unassociated with any joint swelling. The pain is of a burning sensation in the anterior aspect of both knees. She also notes stiffness and pain involving the PIP and DIP joints of the digits of both hands. She noted patchy scalp hair loss 1 year ago. The hair has been growing back recently. She notes over the past 1 year of having her eyes to be sensitive to light. She has had numbness and tingling in her hands. She has noted vague episodes of difficulty with word finding. She has not noted any mucosal ulcerations, dysphagia, Raynaud's phenomena, rashes, or weakness. She notes some improvement in the musculoskeletal pain with taking tapering doses of prednisone intermittently. She has a history of having transiently low white blood cell count and transiently low platelet count. She has lower back pain left more than right with prolonged standing.  PH: Allergies: Keflex (rash); illnesses: Hypertension, chronic rhinitis, vitamin B12 deficiency, vitamin D deficiency, remote right knee injury, overweight; surgeries: Hernia repair, uterine myomectomy during pregnancy.  SH: Tobacco: She quit tobacco smoking 2 years ago but formally smoked tobacco occasionally. Alcohol: She quit alcohol consumption 2 years ago but formally drank more heavily following the death of her parents. She denies illicit drug use. She is employed working in [employer] department.  FH: Her father  with heart disease. Her mother  with lung cancer. Her brother had history of leukemia. Her sister is healthy. She has a son who is healthy and a daughter who is healthy. The daughter was born  with cyst on the brain. She has a paternal aunt with lupus.  PX: She is a well-developed, well-nourished, overweight, black female. The sclera are injected bilaterally. The lungs are clear to auscultation. The heart has a regular rate and rhythm with normal heart sounds. The abdomen is benign. The extremities are without edema. The musculoskeletal examination shows good passive range of motion of the upper and lower extremity joints without joint effusions except for limited flexion of the MCP joint of the right thumb. The slump test is normal. There is mild tenderness over the left lower back.  X-ray knees (10/15/2022) advanced osteoarthritis of both knees worse in right knee than the left knee.  X-ray both shoulders (10/8/2021) mild degenerative changes at the acromioclavicular joint with undersurface osteophytes.  X-ray both wrists (10/8/2021) scalloping of the distal radial ulnar articulation consistent with impingement. There is moderate to severe degenerative changes of the distal radial ulnar joint.  Laboratory (2/7/2024) 25-hydroxy vitamin D 19, vitamin B12 345, TSH 1.52, WBC 4.1, hemoglobin 15.0, hematocrit 44.0, MCV 96, MCHC 34.1, platelets 136, BUN 10, creatinine 0.68, glucose 100, hemoglobin A1c 5.0%, CRP 0.33, ESR 12, (8/8/2022) CCP <1, HEATHER/HEATHER panel negative, rheumatoid factor <10, CRP 0.32, ESR <1, uric acid 6.3, vitamin B12 499, 25-hydroxy vitamin D 21, TSH 2.23, BUN 4, creatinine 0.66, glucose 95, sodium 140, potassium 3.4, WBC 4.5, hemoglobin 14.4, hematocrit 42.0, MCV 91, MCHC 34.3, platelets 121.  Impression: 52 year-old black female with history of hypertension, vitamin D deficiency, vitamin B12 deficiency with normal vitamin B12 level currently, burning pain in the knees and pain and stiffness in the DIP and PIP joints of the digits of both hands and both knees especially in the mornings or after prolonged sitting without any associated joint swelling, she notes photophobia in both eyes with  examination showing normal range of motion of upper and lower extremity joints except for limitation to flexion of the MCP joint of the right thumb. There are no joint effusions. There is tenderness of the left lower back. Laboratory remarkable for mild thrombocytopenia, mild hypokalemia, vitamin D deficiency, normal CCP, HEATHER panel, rheumatoid factor, CRP, and ESR. She has slightly elevated serum uric acid. She has family history of the maternal aunt with lupus. There are otherwise no other symptoms to suggest inflammatory bowel disease.  Plan: She is to have repeat laboratory test for CBC with differential, laboratory for HLA-B27, G6PD screen, and urinalysis. She is to have weightbearing radiographs of the knees. She is to return at the next available office appointment. No medication were prescribed pending test results.

## 2024-05-29 ENCOUNTER — APPOINTMENT (OUTPATIENT)
Dept: RADIOLOGY | Facility: CLINIC | Age: 54
End: 2024-05-29
Payer: COMMERCIAL

## 2024-05-30 ENCOUNTER — TELEMEDICINE (OUTPATIENT)
Dept: PHARMACY | Facility: HOSPITAL | Age: 54
End: 2024-05-30
Payer: COMMERCIAL

## 2024-05-30 DIAGNOSIS — E78.5 HYPERLIPIDEMIA, UNSPECIFIED HYPERLIPIDEMIA TYPE: Primary | ICD-10-CM

## 2024-05-30 RX ORDER — CYANOCOBALAMIN (VITAMIN B-12) 500 MCG
1 TABLET ORAL DAILY
COMMUNITY

## 2024-05-30 NOTE — PROGRESS NOTES
Patient ID: Nell Liao is a 54 y.o. female who presents for Hyperlipidemia.    Referring Provider: Oksana Murray DO  PCP: Oksana Murray DO Last visit with PCP: 05/08/2024. Next visit with PCP: 11/11/2024.    Subjective   Treatment Adherence: not addressed this visit     Preferred pharmacy: [facility]   Can patient afford prescribed medications: N/A    HPI    Review of Systems      Objective     There were no vitals taken for this visit.   BP Readings from Last 4 Encounters:   05/08/24 138/82   11/07/23 132/70   08/18/23 (!) 154/92   05/04/23 (!) 170/104      There were no vitals filed for this visit.     Labs  Lab Results   Component Value Date    BILITOT 0.9 02/07/2024    CALCIUM 9.8 02/07/2024    CO2 29 02/07/2024     02/07/2024    CREATININE 0.68 02/07/2024    GLUCOSE 100 (H) 02/07/2024    ALKPHOS 52 02/07/2024    K 4.0 02/07/2024    PROT 7.2 02/07/2024     02/07/2024    AST 16 02/07/2024    ALT 18 02/07/2024    BUN 10 02/07/2024    ANIONGAP 15 02/07/2024    MG 1.77 02/10/2022    PHOS 4.0 04/08/2021    ALBUMIN 4.4 02/07/2024    GFRF >90 06/06/2023     Lab Results   Component Value Date    TRIG 287 (H) 02/07/2024    CHOL 219 (H) 02/07/2024    LDLCALC 117 (H) 02/07/2024    HDL 44.9 02/07/2024     Lab Results   Component Value Date    HGBA1C 5.0 02/07/2024       Current Outpatient Medications   Medication Instructions    azelastine (Astelin) 137 mcg (0.1 %) nasal spray 2 sprays, nasal, 2 times daily    cholecalciferol (Vitamin D3) 10 MCG (400 UNIT) tablet 1 tablet, oral, Daily    cyanocobalamin (Vitamin B-12) 1,000 mcg/mL injection Cyanocobalamin 1000 MCG/ML Injection Solution  Refills: 0    dilTIAZem XR (DILT-XR) 240 mg, oral, Daily    DULoxetine (CYMBALTA) 30 mg, oral, Daily, X 2wks, then increase to 2 capsules daily thereafter. Do not crush or chew.    hydroCHLOROthiazide (HYDRODIURIL) 25 mg, oral, Daily    losartan (COZAAR) 50 mg, oral, Daily    polyethylene glycol-electrolytes  "(Golytely) 420 gram solution Begin drinking first half of prep 6pm the night before procedure. Start second half 5 hours before procedure time.       Drug Interactions;  None requiring intervention at this time    Assessment/Plan   Problem List Items Addressed This Visit    None  Visit Diagnoses         Codes    Hyperlipidemia, unspecified hyperlipidemia type    -  Primary E78.5    Relevant Orders    Follow Up In Advanced Primary Care - Pharmacy        Assessment:  Patient referred to Clinical Pharmacy services for Hyperlipidemia  Lipid Panel:  Cholesterol: 219  HDL: 44.9  LDL: 117  VLDL: 57  T  Non-HDL: 174  PMH significant for possible TIA  Pending CT cardiac score  Per PCP, if elevated, to start on statin and ASA d/t possible past TIA  ASCVD Risk: 7.6%  \"Intermediate\" risk-recommendation is moderate-intensity statin  Per American College of Cardiology ASCVD Risk  Plus tool  OTC Recommendations:  Ms. Liao inquired regarding recommendations for OTC supplements and dosing  B-12 level: 345 (WNL)-2024  Vitamin D level: 19 (low)-2024    Plan:  Reviewed allergies and medications  Discussed initiating: Atorvastatin 10mg once daily at bedtime  Possible ADRs: GI upset and muscle pain  Interactions:  Diltiazem+Lipitor=Level C Interaction (monitor for increased levels of Lipitor w/moderate CY inhibitors). Pravastatin 40mg nightly is a moderate-intensity statin alternative, does not carry same interaction w/Diltiazem  avoid grapefruit/grapefruit juice  Ms. Liao preferred to obtain imaging results, follow-up with Dr. Murray, and then initiate Statin +/- ASA  Reviewed lifestyle modifications:  150 minutes of exercise per week  Decrease carbs/fried foods, and increase lean protein, vegetables, and fiber  Nutrition referral  Discussed OTC vitamins/supplements  Recommended Women's 50+ MTV (1 tab daily)  Recommended Vitamin D-3 400IU (1 daily, as levels were low)  Recommended Culturelle or Nature's Bounty " probiotic  Follow package dosing recommendations  Discussed variability in ingredients/dosing  B-12 WNL on last lab-work; do not feel additional B-12 supplementation necessary beyond what is in the MTV  Discussed that OTCs are not regulated by FDA; can look for USP ([place] Pharmacopeia) label on OTC products, which indicates stricter manufacturing processes  May be difficult to find  Most OTC brands are acceptable; recommended One-a-day; Centrum; Nature's Bounty, etc. However, I do not endorse any particular brand of product  Recommended discussing w/Dr. Murray regarding all supplements/dosing for further recommendations and guidance  Patient expressed understanding-no further questions    Follow-up: 6 months, following imaging results to initiate statin     Time spent with pt: Total length of time 30 (minutes) of the encounter and more than 50% was spent counseling the patient.    Becki Jiang RPh    Continue all meds under the continuation of care with the referring provider and clinical pharmacy team.

## 2024-05-30 NOTE — PROGRESS NOTES
I reviewed the progress note and agree with the resident’s findings and plans as written. Case discussed with resident.    Claudette Escobar, PharmD

## 2024-06-11 ENCOUNTER — OFFICE VISIT (OUTPATIENT)
Dept: RHEUMATOLOGY | Facility: CLINIC | Age: 54
End: 2024-06-11
Payer: COMMERCIAL

## 2024-06-11 VITALS
DIASTOLIC BLOOD PRESSURE: 84 MMHG | TEMPERATURE: 97.9 F | WEIGHT: 241 LBS | RESPIRATION RATE: 20 BRPM | OXYGEN SATURATION: 98 % | BODY MASS INDEX: 38.9 KG/M2 | HEART RATE: 84 BPM | SYSTOLIC BLOOD PRESSURE: 174 MMHG

## 2024-06-11 DIAGNOSIS — M17.0 PRIMARY OSTEOARTHRITIS OF BOTH KNEES: Primary | ICD-10-CM

## 2024-06-11 PROCEDURE — 3079F DIAST BP 80-89 MM HG: CPT | Performed by: INTERNAL MEDICINE

## 2024-06-11 PROCEDURE — 99213 OFFICE O/P EST LOW 20 MIN: CPT | Performed by: INTERNAL MEDICINE

## 2024-06-11 PROCEDURE — 3008F BODY MASS INDEX DOCD: CPT | Performed by: INTERNAL MEDICINE

## 2024-06-11 PROCEDURE — 3077F SYST BP >= 140 MM HG: CPT | Performed by: INTERNAL MEDICINE

## 2024-06-11 RX ORDER — DULOXETIN HYDROCHLORIDE 20 MG/1
20 CAPSULE, DELAYED RELEASE ORAL DAILY
Qty: 30 CAPSULE | Refills: 11 | Status: SHIPPED | OUTPATIENT
Start: 2024-06-11 | End: 2025-06-11

## 2024-06-11 ASSESSMENT — PAIN SCALES - GENERAL: PAINLEVEL: 7

## 2024-06-11 NOTE — PROGRESS NOTES
She complains of pain involving her knees.  The pain in the knees limits her ambulation.  The pain tends to be more significant with activity and improves with rest.  She also notes some improvement in the pain with using topical Vicks salve.  She was unable to see the orthopedic surgeons yet because of other obligations.  She has tried over-the-counter nonsteroidal anti-inflammatory medications and topical liniments.  She was previously prescribed duloxetine by her primary physician but did not start it yet.    Examination shows bony prominence of the knees without joint effusion.  There is crepitus on range of motion of both knees, right more than left.  There is overall preserved range of motion of the knees.  .  Laboratory (2/7/2024) WBC 4.1, hemoglobin 15.0, hematocrit 44.0, MCV 96, MCHC 34.0, platelets 136, BUN 10, creatinine 0.68, glucose 100, ESR 12, CRP 0.33, TSH 1.52, hemoglobin A1c 5.0%.  X-ray bilateral knees (10/15/2022) advanced degenerative arthritis of both knees, right worse than left.    She has advanced osteoarthritis of the knees, history of vitamin B12 deficiency, hypertension, hypercholesterolemia, sicca syndrome, and G6PD deficiency.    She is to start duloxetine 20 mg daily.  She is again referred to orthopedics for assistance with management of osteoarthritis pain of the knees.  She is to return at the next available office appointment.

## 2024-06-14 ENCOUNTER — APPOINTMENT (OUTPATIENT)
Dept: RADIOLOGY | Facility: CLINIC | Age: 54
End: 2024-06-14
Payer: COMMERCIAL

## 2024-06-27 ENCOUNTER — APPOINTMENT (OUTPATIENT)
Dept: RADIOLOGY | Facility: CLINIC | Age: 54
End: 2024-06-27
Payer: COMMERCIAL

## 2024-07-19 ENCOUNTER — APPOINTMENT (OUTPATIENT)
Dept: RADIOLOGY | Facility: CLINIC | Age: 54
End: 2024-07-19
Payer: COMMERCIAL

## 2024-07-22 ENCOUNTER — APPOINTMENT (OUTPATIENT)
Dept: PRIMARY CARE | Facility: CLINIC | Age: 54
End: 2024-07-22
Payer: COMMERCIAL

## 2024-07-24 ENCOUNTER — APPOINTMENT (OUTPATIENT)
Dept: PRIMARY CARE | Facility: CLINIC | Age: 54
End: 2024-07-24
Payer: COMMERCIAL

## 2024-07-29 ENCOUNTER — APPOINTMENT (OUTPATIENT)
Dept: GASTROENTEROLOGY | Facility: EXTERNAL LOCATION | Age: 54
End: 2024-07-29
Payer: COMMERCIAL

## 2024-10-03 ENCOUNTER — APPOINTMENT (OUTPATIENT)
Dept: ORTHOPEDIC SURGERY | Facility: CLINIC | Age: 54
End: 2024-10-03
Payer: COMMERCIAL

## 2024-10-03 ENCOUNTER — APPOINTMENT (OUTPATIENT)
Dept: RADIOLOGY | Facility: CLINIC | Age: 54
End: 2024-10-03
Payer: COMMERCIAL

## 2024-10-23 ENCOUNTER — HOSPITAL ENCOUNTER (OUTPATIENT)
Dept: RADIOLOGY | Facility: CLINIC | Age: 54
Discharge: HOME | End: 2024-10-23
Payer: COMMERCIAL

## 2024-10-23 ENCOUNTER — LAB (OUTPATIENT)
Dept: LAB | Facility: LAB | Age: 54
End: 2024-10-23
Payer: COMMERCIAL

## 2024-10-23 ENCOUNTER — TELEPHONE (OUTPATIENT)
Dept: PRIMARY CARE | Facility: CLINIC | Age: 54
End: 2024-10-23

## 2024-10-23 DIAGNOSIS — R40.0 DAYTIME SLEEPINESS: ICD-10-CM

## 2024-10-23 DIAGNOSIS — E78.5 HYPERLIPIDEMIA, UNSPECIFIED HYPERLIPIDEMIA TYPE: ICD-10-CM

## 2024-10-23 DIAGNOSIS — Z00.00 WELLNESS EXAMINATION: ICD-10-CM

## 2024-10-23 DIAGNOSIS — I10 HYPERTENSION, UNSPECIFIED TYPE: ICD-10-CM

## 2024-10-23 LAB
25(OH)D3 SERPL-MCNC: 31 NG/ML (ref 30–100)
ALBUMIN SERPL BCP-MCNC: 4.5 G/DL (ref 3.4–5)
ALP SERPL-CCNC: 59 U/L (ref 33–110)
ALT SERPL W P-5'-P-CCNC: 25 U/L (ref 7–45)
ANION GAP SERPL CALC-SCNC: 16 MMOL/L (ref 10–20)
AST SERPL W P-5'-P-CCNC: 25 U/L (ref 9–39)
BASOPHILS # BLD AUTO: 0.04 X10*3/UL (ref 0–0.1)
BASOPHILS NFR BLD AUTO: 1.1 %
BILIRUB SERPL-MCNC: 1.3 MG/DL (ref 0–1.2)
BUN SERPL-MCNC: 9 MG/DL (ref 6–23)
CALCIUM SERPL-MCNC: 9.5 MG/DL (ref 8.6–10.6)
CHLORIDE SERPL-SCNC: 100 MMOL/L (ref 98–107)
CO2 SERPL-SCNC: 29 MMOL/L (ref 21–32)
CREAT SERPL-MCNC: 0.69 MG/DL (ref 0.5–1.05)
EGFRCR SERPLBLD CKD-EPI 2021: >90 ML/MIN/1.73M*2
EOSINOPHIL # BLD AUTO: 0.02 X10*3/UL (ref 0–0.7)
EOSINOPHIL NFR BLD AUTO: 0.5 %
ERYTHROCYTE [DISTWIDTH] IN BLOOD BY AUTOMATED COUNT: 13.1 % (ref 11.5–14.5)
EST. AVERAGE GLUCOSE BLD GHB EST-MCNC: 91 MG/DL
GLUCOSE SERPL-MCNC: 124 MG/DL (ref 74–99)
HBA1C MFR BLD: 4.8 %
HCT VFR BLD AUTO: 41.5 % (ref 36–46)
HGB BLD-MCNC: 14.1 G/DL (ref 12–16)
IMM GRANULOCYTES # BLD AUTO: 0.01 X10*3/UL (ref 0–0.7)
IMM GRANULOCYTES NFR BLD AUTO: 0.3 % (ref 0–0.9)
LYMPHOCYTES # BLD AUTO: 1.89 X10*3/UL (ref 1.2–4.8)
LYMPHOCYTES NFR BLD AUTO: 49.9 %
MCH RBC QN AUTO: 31.8 PG (ref 26–34)
MCHC RBC AUTO-ENTMCNC: 34 G/DL (ref 32–36)
MCV RBC AUTO: 94 FL (ref 80–100)
MONOCYTES # BLD AUTO: 0.36 X10*3/UL (ref 0.1–1)
MONOCYTES NFR BLD AUTO: 9.5 %
NEUTROPHILS # BLD AUTO: 1.47 X10*3/UL (ref 1.2–7.7)
NEUTROPHILS NFR BLD AUTO: 38.7 %
NRBC BLD-RTO: 0 /100 WBCS (ref 0–0)
PLATELET # BLD AUTO: 104 X10*3/UL (ref 150–450)
POTASSIUM SERPL-SCNC: 3.5 MMOL/L (ref 3.5–5.3)
PROT SERPL-MCNC: 7.2 G/DL (ref 6.4–8.2)
RBC # BLD AUTO: 4.44 X10*6/UL (ref 4–5.2)
SODIUM SERPL-SCNC: 141 MMOL/L (ref 136–145)
TSH SERPL-ACNC: 2.2 MIU/L (ref 0.44–3.98)
VIT B12 SERPL-MCNC: 422 PG/ML (ref 211–911)
WBC # BLD AUTO: 3.8 X10*3/UL (ref 4.4–11.3)

## 2024-10-23 PROCEDURE — 75571 CT HRT W/O DYE W/CA TEST: CPT

## 2024-10-23 PROCEDURE — 84443 ASSAY THYROID STIM HORMONE: CPT

## 2024-10-23 PROCEDURE — 80053 COMPREHEN METABOLIC PANEL: CPT

## 2024-10-23 PROCEDURE — 36415 COLL VENOUS BLD VENIPUNCTURE: CPT

## 2024-10-23 PROCEDURE — 82306 VITAMIN D 25 HYDROXY: CPT

## 2024-10-23 PROCEDURE — 82607 VITAMIN B-12: CPT

## 2024-10-23 PROCEDURE — 83036 HEMOGLOBIN GLYCOSYLATED A1C: CPT

## 2024-10-23 PROCEDURE — 85025 COMPLETE CBC W/AUTO DIFF WBC: CPT

## 2024-10-23 NOTE — TELEPHONE ENCOUNTER
Spoke with Dr. Murray advised meto call patient.  Called patient asked if she was experiencing any loss of vision today if so go to er. Spoke with patient, patient stated no just has been experiencing some blurred vision.

## 2024-10-23 NOTE — TELEPHONE ENCOUNTER
Patient has been experiencing loss of vision in and out for about 2 months now in both eyes. Patient stated it has a least occurred 4 times throughout the month of sept and oct.  Also has been experiencing dry mouth and lightheaded      Please Advice, TY

## 2024-10-25 ENCOUNTER — OFFICE VISIT (OUTPATIENT)
Dept: PRIMARY CARE | Facility: CLINIC | Age: 54
End: 2024-10-25
Payer: COMMERCIAL

## 2024-10-25 VITALS
DIASTOLIC BLOOD PRESSURE: 80 MMHG | HEIGHT: 66 IN | HEART RATE: 87 BPM | RESPIRATION RATE: 14 BRPM | BODY MASS INDEX: 37.91 KG/M2 | WEIGHT: 235.9 LBS | TEMPERATURE: 99 F | OXYGEN SATURATION: 98 % | SYSTOLIC BLOOD PRESSURE: 148 MMHG

## 2024-10-25 DIAGNOSIS — H53.8 BLURRED VISION: ICD-10-CM

## 2024-10-25 DIAGNOSIS — R42 DIZZINESS: ICD-10-CM

## 2024-10-25 DIAGNOSIS — R51.9 NONINTRACTABLE HEADACHE, UNSPECIFIED CHRONICITY PATTERN, UNSPECIFIED HEADACHE TYPE: ICD-10-CM

## 2024-10-25 DIAGNOSIS — D69.6 THROMBOCYTOPENIA (CMS-HCC): Primary | ICD-10-CM

## 2024-10-25 PROCEDURE — 3079F DIAST BP 80-89 MM HG: CPT | Performed by: FAMILY MEDICINE

## 2024-10-25 PROCEDURE — 99215 OFFICE O/P EST HI 40 MIN: CPT | Performed by: FAMILY MEDICINE

## 2024-10-25 PROCEDURE — 3008F BODY MASS INDEX DOCD: CPT | Performed by: FAMILY MEDICINE

## 2024-10-25 PROCEDURE — 3077F SYST BP >= 140 MM HG: CPT | Performed by: FAMILY MEDICINE

## 2024-10-25 ASSESSMENT — PATIENT HEALTH QUESTIONNAIRE - PHQ9
SUM OF ALL RESPONSES TO PHQ9 QUESTIONS 1 AND 2: 0
2. FEELING DOWN, DEPRESSED OR HOPELESS: NOT AT ALL
1. LITTLE INTEREST OR PLEASURE IN DOING THINGS: NOT AT ALL

## 2024-10-25 NOTE — Clinical Note
October 25, 2024     Patient: Nell Liao   YOB: 1970   Date of Visit: 10/25/2024       To Whom It May Concern:    Nell Liao was seen in my clinic on 10/25/2024 at 7:40 am. Please excuse Nell for her absence from work on this day to make the appointment.    If you have any questions or concerns, please don't hesitate to call.         Sincerely,         Oksana Murray,         CC: No Recipients

## 2024-10-25 NOTE — PROGRESS NOTES
"Subjective   Patient ID: Nell Liao is a 54 y.o. female who presents for Follow-up (Pt is here for c/o blurry vision, lightheadedness, dry mouth for the past few months.).    HPI     Reports 4 episodes over the last several months of dizziness after standing- lasting for a couple of mins then resolving.  Noticed some blurred vision and headaches as well    HTN  BP at goal today in office.  Using medications without issues.  Denies CP, SOB, palpitations, change in vision, dizziness, N/V.      Labs noted to have low platelet count.  No increased bleeding or bruising noted    Review of Systems  All systems reviewed and neg if not noted in the HPI above   Objective   /82 (Patient Position: Sitting)   Pulse 87   Temp 37.2 °C (99 °F)   Resp 14   Ht 1.676 m (5' 6\")   Wt 107 kg (235 lb 14.4 oz)   SpO2 98%   BMI 38.08 kg/m²     Physical Exam  Vitals reviewed.   Constitutional:       Appearance: Normal appearance.   HENT:      Head: Normocephalic.   Eyes:      Extraocular Movements: Extraocular movements intact.   Cardiovascular:      Rate and Rhythm: Normal rate and regular rhythm.      Heart sounds: Normal heart sounds. No murmur heard.  Pulmonary:      Effort: Pulmonary effort is normal. No respiratory distress.      Breath sounds: Normal breath sounds. No wheezing.   Abdominal:      General: Bowel sounds are normal. There is no distension.      Palpations: Abdomen is soft.   Skin:     General: Skin is warm and dry.   Neurological:      General: No focal deficit present.      Mental Status: She is alert and oriented to person, place, and time.      Cranial Nerves: Cranial nerves 2-12 are intact. No cranial nerve deficit.      Sensory: Sensation is intact.      Motor: Motor function is intact.      Coordination: Coordination is intact.   Psychiatric:         Mood and Affect: Mood normal.         Behavior: Behavior normal.         Thought Content: Thought content normal.         Judgment: Judgment normal. "         Assessment/Plan   Problem List Items Addressed This Visit             ICD-10-CM    Headache R51.9    Relevant Orders    Referral to Neurology    MR brain w and wo IV contrast    CBC and Auto Differential    Comprehensive Metabolic Panel    Urinalysis with Reflex Microscopic    Thrombocytopenia (CMS-HCC) - Primary D69.6    Relevant Orders    Referral to Hematology and Oncology    MR brain w and wo IV contrast    CBC and Auto Differential    Comprehensive Metabolic Panel    Urinalysis with Reflex Microscopic     Other Visit Diagnoses         Codes    Blurred vision     H53.8    Relevant Orders    Referral to Neurology    MR brain w and wo IV contrast    Referral to Ophthalmology    CBC and Auto Differential    Comprehensive Metabolic Panel    Urinalysis with Reflex Microscopic    Dizziness     R42    Relevant Orders    Referral to Neurology    MR brain w and wo IV contrast    CBC and Auto Differential    Comprehensive Metabolic Panel    Urinalysis with Reflex Microscopic                 Please follow up as scheduled in NOV or as needed.

## 2024-10-25 NOTE — PATIENT INSTRUCTIONS
Headaches/ dizziness/ blurried vision  - referral for eye team  - referral for neurology  - ordered MRI  - review labs  - ok to try PT    Low platelet  - hematology check      Please follow up as scheduled in NOV or as needed.       ** If labs or imaging ordered at today's visit, all the non-urgent results will be discussed at your next visit    If you have been referred for a special test or to a specialist please call  [phone] to schedule an appointment.  If you have any further questions, or if develop new or worsened symptoms, please give our office a call at [phone].

## 2024-10-31 ENCOUNTER — APPOINTMENT (OUTPATIENT)
Dept: PHARMACY | Facility: HOSPITAL | Age: 54
End: 2024-10-31
Payer: COMMERCIAL

## 2024-10-31 ENCOUNTER — OFFICE VISIT (OUTPATIENT)
Dept: ORTHOPEDIC SURGERY | Facility: CLINIC | Age: 54
End: 2024-10-31
Payer: COMMERCIAL

## 2024-10-31 ENCOUNTER — HOSPITAL ENCOUNTER (OUTPATIENT)
Dept: RADIOLOGY | Facility: CLINIC | Age: 54
Discharge: HOME | End: 2024-10-31
Payer: COMMERCIAL

## 2024-10-31 DIAGNOSIS — E78.5 HYPERLIPIDEMIA, UNSPECIFIED HYPERLIPIDEMIA TYPE: Primary | ICD-10-CM

## 2024-10-31 DIAGNOSIS — M25.561 ACUTE BILATERAL KNEE PAIN: ICD-10-CM

## 2024-10-31 DIAGNOSIS — M25.562 ACUTE BILATERAL KNEE PAIN: ICD-10-CM

## 2024-10-31 DIAGNOSIS — M17.0 PRIMARY OSTEOARTHRITIS OF BOTH KNEES: ICD-10-CM

## 2024-10-31 PROCEDURE — 1036F TOBACCO NON-USER: CPT | Performed by: ORTHOPAEDIC SURGERY

## 2024-10-31 PROCEDURE — 73562 X-RAY EXAM OF KNEE 3: CPT | Mod: 50

## 2024-10-31 PROCEDURE — 99203 OFFICE O/P NEW LOW 30 MIN: CPT | Performed by: ORTHOPAEDIC SURGERY

## 2024-11-07 ENCOUNTER — APPOINTMENT (OUTPATIENT)
Dept: ORTHOPEDIC SURGERY | Facility: CLINIC | Age: 54
End: 2024-11-07
Payer: COMMERCIAL

## 2024-11-07 DIAGNOSIS — M17.0 PRIMARY OSTEOARTHRITIS OF BOTH KNEES: Primary | ICD-10-CM

## 2024-11-07 RX ORDER — LIDOCAINE HYDROCHLORIDE 20 MG/ML
2 INJECTION, SOLUTION INFILTRATION; PERINEURAL
Status: COMPLETED | OUTPATIENT
Start: 2024-11-07 | End: 2024-11-07

## 2024-11-07 RX ORDER — METHYLPREDNISOLONE ACETATE 40 MG/ML
40 INJECTION, SUSPENSION INTRA-ARTICULAR; INTRALESIONAL; INTRAMUSCULAR; SOFT TISSUE
Status: COMPLETED | OUTPATIENT
Start: 2024-11-07 | End: 2024-11-07

## 2024-11-11 ENCOUNTER — APPOINTMENT (OUTPATIENT)
Dept: PRIMARY CARE | Facility: CLINIC | Age: 54
End: 2024-11-11
Payer: COMMERCIAL

## 2024-11-12 ENCOUNTER — HOSPITAL ENCOUNTER (OUTPATIENT)
Dept: RADIOLOGY | Facility: CLINIC | Age: 54
Discharge: HOME | End: 2024-11-12
Payer: COMMERCIAL

## 2024-11-12 DIAGNOSIS — R51.9 NONINTRACTABLE HEADACHE, UNSPECIFIED CHRONICITY PATTERN, UNSPECIFIED HEADACHE TYPE: ICD-10-CM

## 2024-11-12 DIAGNOSIS — R42 DIZZINESS: ICD-10-CM

## 2024-11-12 DIAGNOSIS — D69.6 THROMBOCYTOPENIA (CMS-HCC): ICD-10-CM

## 2024-11-12 DIAGNOSIS — H53.8 BLURRED VISION: ICD-10-CM

## 2024-11-12 PROCEDURE — A9575 INJ GADOTERATE MEGLUMI 0.1ML: HCPCS | Performed by: FAMILY MEDICINE

## 2024-11-12 PROCEDURE — 70553 MRI BRAIN STEM W/O & W/DYE: CPT | Performed by: RADIOLOGY

## 2024-11-12 PROCEDURE — 70553 MRI BRAIN STEM W/O & W/DYE: CPT

## 2024-11-12 PROCEDURE — 2550000001 HC RX 255 CONTRASTS: Performed by: FAMILY MEDICINE

## 2024-11-12 RX ORDER — GADOTERATE MEGLUMINE 376.9 MG/ML
20 INJECTION INTRAVENOUS
Status: COMPLETED | OUTPATIENT
Start: 2024-11-12 | End: 2024-11-12

## 2024-11-14 ENCOUNTER — APPOINTMENT (OUTPATIENT)
Dept: PHARMACY | Facility: HOSPITAL | Age: 54
End: 2024-11-14
Payer: COMMERCIAL

## 2024-11-15 ENCOUNTER — APPOINTMENT (OUTPATIENT)
Dept: RHEUMATOLOGY | Facility: CLINIC | Age: 54
End: 2024-11-15
Payer: COMMERCIAL

## 2024-11-18 ENCOUNTER — APPOINTMENT (OUTPATIENT)
Dept: PRIMARY CARE | Facility: CLINIC | Age: 54
End: 2024-11-18
Payer: COMMERCIAL

## 2024-12-05 DIAGNOSIS — I10 PRIMARY HYPERTENSION: ICD-10-CM

## 2024-12-05 DIAGNOSIS — I10 HYPERTENSION, UNSPECIFIED TYPE: ICD-10-CM

## 2024-12-09 RX ORDER — DILTIAZEM HYDROCHLORIDE 240 MG/1
240 CAPSULE, EXTENDED RELEASE ORAL DAILY
Qty: 90 CAPSULE | Refills: 3 | Status: SHIPPED | OUTPATIENT
Start: 2024-12-09

## 2024-12-09 RX ORDER — HYDROCHLOROTHIAZIDE 25 MG/1
25 TABLET ORAL DAILY
Qty: 90 TABLET | Refills: 3 | Status: SHIPPED | OUTPATIENT
Start: 2024-12-09

## 2024-12-09 RX ORDER — LOSARTAN POTASSIUM 50 MG/1
50 TABLET ORAL DAILY
Qty: 90 TABLET | Refills: 3 | Status: SHIPPED | OUTPATIENT
Start: 2024-12-09

## 2025-02-20 ENCOUNTER — APPOINTMENT (OUTPATIENT)
Dept: PRIMARY CARE | Facility: CLINIC | Age: 55
End: 2025-02-20
Payer: COMMERCIAL

## 2025-02-24 ENCOUNTER — DOCUMENTATION (OUTPATIENT)
Dept: HEMATOLOGY/ONCOLOGY | Facility: HOSPITAL | Age: 55
End: 2025-02-24
Payer: COMMERCIAL

## 2025-02-24 NOTE — PROGRESS NOTES
Diagnosis thrombocytopenia. History includes-thrombocytopenia, MARITZA, G6PD deficiency, dysthymia, abnormal weight loss, anal fissure, anxiety, back pain, OA, chronic rhinitis, diarrhea, dizziness, blurred vision, headache, grief reaction, HLD, HTN, Vitamin B and D deficiency, multiple joint pains.  Last labs 10/23/24 WBC 3.8, RBC 4.44, HGB 14.1, Hematocrit 41.5,   Patient was left a message with details on appointment scheduled for 3/10/25 with JUSTIN Duran PA-C. Call [phone] to cancel or reschedule this appointment.

## 2025-02-26 ENCOUNTER — APPOINTMENT (OUTPATIENT)
Dept: OPHTHALMOLOGY | Facility: CLINIC | Age: 55
End: 2025-02-26
Payer: COMMERCIAL

## 2025-02-28 ENCOUNTER — APPOINTMENT (OUTPATIENT)
Dept: RHEUMATOLOGY | Facility: CLINIC | Age: 55
End: 2025-02-28
Payer: COMMERCIAL

## 2025-03-03 ENCOUNTER — APPOINTMENT (OUTPATIENT)
Dept: OPHTHALMOLOGY | Facility: CLINIC | Age: 55
End: 2025-03-03
Payer: COMMERCIAL

## 2025-03-10 ENCOUNTER — APPOINTMENT (OUTPATIENT)
Dept: HEMATOLOGY/ONCOLOGY | Facility: CLINIC | Age: 55
End: 2025-03-10
Payer: COMMERCIAL

## 2025-03-10 NOTE — PROGRESS NOTES
Patient ID: Nell Liao is a 54 y.o. female.  Referring Physician: Oksana Murray DO  [address]  [address]  [place] [address]  Primary Care Provider: Oksana Murray DO  Visit Type: Initial Visit    Location: [facility]  Diagnosis/Reason: Thrombocytopenia    HPI:  Nell Liao is a 54 y.o. female referred for consultation of thrombocytopenia    NOTE:  3/10/25 - Medical hx significant for: Vitamin B deficiency, vitamin D deficiency, diarrhea, anal fissure, diverticulosis, G6PD deficiency, HSV-2 seropositive, chronic pain B/L shoulders, knees, ankles, facial pressure    Per review of records:  Intermittent leukopenia since at least 5/23/22 with leukocytes as low as 3.8 (on 5/23/22, 10/23/24)  Predominant thrombocytopenia since at least 2/3/20 with platelet range of 104K-163K since then    Previous Hematological Background:  Hx of hematological disorders: {BDHEMEHX3:90026}  Hx of blood transfusions: {BDHEMEHX4:43457}  Hx of iron supplementation: {BDHEMEHX:27661}  Hx of B12 supplementation: {BDHEMEHX2:22180}  Hx of folate supplementation: {BDHEMEHX2:39825}    Today she c/o ***    Patient denies weight loss, abnormal bruising and bleeding, hematuria, blood in stool, dark/black stools, epistaxis, oral/gingival bleeding, lymphadenopathy, recurrent infections, recurrent fevers, night sweats, early satiety, abdominal pain, bone pain, chest pain, palpitations, SOB, VEGAS, fatigue, dizziness, lightheadedness, PICA.    PMHx:  Active Ambulatory Problems     Diagnosis Date Noted    Abnormal brain MRI 08/18/2023    Acute hyperglycemia 08/18/2023    Anxiety 08/18/2023    Back pain 08/18/2023    Chronic pain of both ankles 08/18/2023    Chronic pain of both knees 08/18/2023    Chronic rhinitis 08/18/2023    Diarrhea 08/18/2023    G6PD deficiency 08/18/2023    Grief reaction with prolonged bereavement 08/18/2023    Headache 08/18/2023    HSV-2 seropositive 08/18/2023    Hypertension 08/18/2023     Hypertrophy of both inferior nasal turbinates 08/18/2023    Multiple joint pain 08/18/2023    Night sweat 08/18/2023    Chronic pain of both shoulders 08/18/2023    Pain in both hands 08/18/2023    Pain in both wrists 08/18/2023    Facial pressure 08/18/2023    Thrombocytopenia (CMS-HCC) 08/18/2023    Vitamin B deficiency 08/18/2023    Vitamin D deficiency 08/18/2023    Anal fissure 11/17/2015    Diverticulosis of large intestine without hemorrhage 11/17/2015    Dysthymia 11/10/2017    Iron deficiency anemia 11/17/2015    Abnormal weight loss 05/08/2024    Knee injury 05/08/2024    Pain of hand 05/08/2024    Pain in wrist 05/08/2024    Shoulder pain 05/08/2024    Hyperlipidemia 10/31/2024     Resolved Ambulatory Problems     Diagnosis Date Noted    Paresthesia 08/18/2023    Sinus congestion 08/18/2023    TIA (transient ischemic attack) 08/18/2023    Obesity, Class I, BMI 30-34.9 04/04/2019    Obesity, Class II, BMI 35-39.9 01/14/2020    Subacute combined degeneration (Multi) 02/20/2015    Acute sinusitis 05/08/2024     Past Medical History:   Diagnosis Date    Abnormal finding of blood chemistry, unspecified 02/12/2015    Anxiety disorder, unspecified 06/23/2021    Encounter for screening for infections with a predominantly sexual mode of transmission 05/19/2021    Other specified anxiety disorders 07/18/2021    Pain in unspecified knee 08/25/2014    Personal history of other diseases of the respiratory system 06/23/2021    Personal history of other specified conditions 08/25/2014    Personal history of other specified conditions 08/25/2014    Unspecified injury of right lower leg, initial encounter 08/25/2014     PSHx:  Past Surgical History:   Procedure Laterality Date    OTHER SURGICAL HISTORY  12/28/2021    Hernia repair    OTHER SURGICAL HISTORY  12/28/2021    Uterine myomectomy      ***  Have you had your wisdom teeth removed: ***    FHx:  No family history on file.   Mother: ***  Father: ***  Siblings:  "***  Children: ***  Miscarriages: ***    Social Hx:  Nell Liao    reports that she has quit smoking. Her smoking use included cigarettes. She has never used smokeless tobacco.  She  reports no history of alcohol use.  She  reports no history of drug use.  Social History     Socioeconomic History    Marital status: Single   Tobacco Use    Smoking status: Former     Types: Cigarettes    Smokeless tobacco: Never   Substance and Sexual Activity    Alcohol use: Never    Drug use: Never      Living Situation: ***  Occupation: ***  Marital Status: ***  Alcohol Use: ***  Smoking: ***  Recreational Drug Use: ***  Special Diets: ***  Ethnicity: ***  Mandaeism: ***    Cancer Screenings:  Upper EGD: No record in EMR  Colonoscopy: Ordered 5/8/24 - Not yet completed as of 3/10/25   Mammogram: 7/9/22  PAP smear: No record in EMR  Lung cancer screenings: No record in EMR    Medications and allergies reviewed in EMR.    ROS:  Review of Systems - Oncology   10 point review of systems negative except as state in HPI.    Vitals & Statistics:  Objective   BSA: There is no height or weight on file to calculate BSA.  There were no vitals taken for this visit.    Physical Exam:  Physical Exam      Results:  Lab Results   Component Value Date    WBC 3.8 (L) 10/23/2024    NEUTROABS 1.47 10/23/2024    IGABSOL 0.01 10/23/2024    LYMPHSABS 1.89 10/23/2024    MONOSABS 0.36 10/23/2024    EOSABS 0.02 10/23/2024    BASOSABS 0.04 10/23/2024    RBC 4.44 10/23/2024    MCV 94 10/23/2024    MCHC 34.0 10/23/2024    HGB 14.1 10/23/2024    HCT 41.5 10/23/2024     (L) 10/23/2024     No results found for: \"RETICCTPCT\"   Lab Results   Component Value Date    CREATININE 0.69 10/23/2024    BUN 9 10/23/2024    EGFR >90 10/23/2024     10/23/2024    K 3.5 10/23/2024     10/23/2024    CO2 29 10/23/2024      Lab Results   Component Value Date    ALT 25 10/23/2024    AST 25 10/23/2024    ALKPHOS 59 10/23/2024    BILITOT 1.3 (H) 10/23/2024    " "  Lab Results   Component Value Date    TSH 2.20 10/23/2024     Lab Results   Component Value Date    TSH 2.20 10/23/2024     No results found for: \"IRON\", \"TIBC\", \"FERRITIN\"   Lab Results   Component Value Date    ALUYEBCE60 422 10/23/2024      No results found for: \"FOLATE\"  Lab Results   Component Value Date    HEATHER NEGATIVE 10/15/2022    RF <10 06/06/2023    SEDRATE 12 02/07/2024      Lab Results   Component Value Date    CRP 0.33 02/07/2024      No results found for: \"ROSANA\"  No results found for: \"LDH\"  No results found for: \"HAPTOGLOBIN\"  Lab Results   Component Value Date    SPEP NORMAL 06/06/2023     No results found for: \"IGG\", \"IGM\", \"IGA\"  Lab Results   Component Value Date    HEPBSAG NONREACTIVE 05/19/2021     Lab Results   Component Value Date    HIV1X2 NONREACTIVE 11/11/2021       Assessment:  Nell Liao is a 54 y.o. female referred for consultation of thrombocytopenia    I reviewed patient's chart including but not limited to labs, imaging, surgical/procedure notes, pathology, hospital notes, doctor's notes.    Relevant historical labs/imaging:  10/23/24  TSH: WNL    3/10/25 results:  ***    Plan:  Discussed possible etiologies of thrombocytopenia including: vitamin deficiency, enlarged spleen/liver, autoimmune disease, infection, inflammatory disorder, allergies, collection tube clumping, etc. Will start hematological workup today.    Thrombocytopenia  Lab results pending - Will review when available and address adverse results as needed  RTC in 2-4 weeks via in-person visit - F/U sooner if needed/urgent    I had an extensive discussion with the patient regarding the diagnosis and discussed the plan of therapy, including general considerations regarding side effects and outcomes. Pt understood and gave appropriate teach back about the plan of care. All questions were answered to the patient's satisfaction. The patient is instructed to contact us at any time if questions or problems arise. Thank " you for the opportunity to participate in the care of this very pleasant patient.    Total time = 80 minutes. 50% or more of this time was spent in counseling and/or coordination of care including reviewing medical history/radiology/labs, examining patient, formulating outlined plan with team, and discussing plan with patient/family.    José Miguel Duran PA-C

## 2025-03-25 ENCOUNTER — HOSPITAL ENCOUNTER (OUTPATIENT)
Dept: RADIOLOGY | Facility: HOSPITAL | Age: 55
Discharge: HOME | End: 2025-03-25
Payer: COMMERCIAL

## 2025-03-25 ENCOUNTER — APPOINTMENT (OUTPATIENT)
Dept: LAB | Facility: HOSPITAL | Age: 55
End: 2025-03-25
Payer: COMMERCIAL

## 2025-03-25 ENCOUNTER — LAB (OUTPATIENT)
Dept: LAB | Facility: HOSPITAL | Age: 55
End: 2025-03-25
Payer: COMMERCIAL

## 2025-03-25 ENCOUNTER — OFFICE VISIT (OUTPATIENT)
Dept: HEMATOLOGY/ONCOLOGY | Facility: HOSPITAL | Age: 55
End: 2025-03-25
Payer: COMMERCIAL

## 2025-03-25 VITALS
RESPIRATION RATE: 16 BRPM | OXYGEN SATURATION: 100 % | BODY MASS INDEX: 39.83 KG/M2 | HEART RATE: 75 BPM | DIASTOLIC BLOOD PRESSURE: 99 MMHG | WEIGHT: 239.1 LBS | SYSTOLIC BLOOD PRESSURE: 196 MMHG | HEIGHT: 65 IN | TEMPERATURE: 97.7 F

## 2025-03-25 DIAGNOSIS — D69.6 THROMBOCYTOPENIA (CMS-HCC): Primary | ICD-10-CM

## 2025-03-25 DIAGNOSIS — D69.6 THROMBOCYTOPENIA (CMS-HCC): ICD-10-CM

## 2025-03-25 LAB
ALBUMIN SERPL BCP-MCNC: 4.5 G/DL (ref 3.4–5)
ALP SERPL-CCNC: 44 U/L (ref 33–110)
ALT SERPL W P-5'-P-CCNC: 13 U/L (ref 7–45)
ANION GAP SERPL CALC-SCNC: 11 MMOL/L (ref 10–20)
APTT PPP: 31 SECONDS (ref 26–36)
AST SERPL W P-5'-P-CCNC: 14 U/L (ref 9–39)
BASOPHILS # BLD AUTO: 0.05 X10*3/UL (ref 0–0.1)
BASOPHILS NFR BLD AUTO: 1.3 %
BILIRUB SERPL-MCNC: 1.1 MG/DL (ref 0–1.2)
BUN SERPL-MCNC: 5 MG/DL (ref 6–23)
CALCIUM SERPL-MCNC: 9.2 MG/DL (ref 8.6–10.3)
CHLORIDE SERPL-SCNC: 104 MMOL/L (ref 98–107)
CO2 SERPL-SCNC: 28 MMOL/L (ref 21–32)
CREAT SERPL-MCNC: 0.73 MG/DL (ref 0.5–1.05)
EGFRCR SERPLBLD CKD-EPI 2021: >90 ML/MIN/1.73M*2
EOSINOPHIL # BLD AUTO: 0.03 X10*3/UL (ref 0–0.7)
EOSINOPHIL NFR BLD AUTO: 0.8 %
ERYTHROCYTE [DISTWIDTH] IN BLOOD BY AUTOMATED COUNT: 13.2 % (ref 11.5–14.5)
ERYTHROCYTE [SEDIMENTATION RATE] IN BLOOD BY WESTERGREN METHOD: 1 MM/H (ref 0–30)
FERRITIN SERPL-MCNC: 54 NG/ML (ref 8–150)
GLUCOSE SERPL-MCNC: 101 MG/DL (ref 74–99)
HCT VFR BLD AUTO: 40.5 % (ref 36–46)
HGB BLD-MCNC: 13.6 G/DL (ref 12–16)
IMM GRANULOCYTES # BLD AUTO: 0.01 X10*3/UL (ref 0–0.7)
IMM GRANULOCYTES NFR BLD AUTO: 0.3 % (ref 0–0.9)
INR PPP: 1 (ref 0.9–1.1)
IRON SATN MFR SERPL: 22 % (ref 25–45)
IRON SERPL-MCNC: 109 UG/DL (ref 35–150)
LDH SERPL L TO P-CCNC: 144 U/L (ref 84–246)
LYMPHOCYTES # BLD AUTO: 1.96 X10*3/UL (ref 1.2–4.8)
LYMPHOCYTES NFR BLD AUTO: 52.7 %
MCH RBC QN AUTO: 31.7 PG (ref 26–34)
MCHC RBC AUTO-ENTMCNC: 33.6 G/DL (ref 32–36)
MCV RBC AUTO: 94 FL (ref 80–100)
MONOCYTES # BLD AUTO: 0.3 X10*3/UL (ref 0.1–1)
MONOCYTES NFR BLD AUTO: 8.1 %
NEUTROPHILS # BLD AUTO: 1.37 X10*3/UL (ref 1.2–7.7)
NEUTROPHILS NFR BLD AUTO: 36.8 %
NRBC BLD-RTO: 0 /100 WBCS (ref 0–0)
PLATELET # BLD AUTO: 120 X10*3/UL (ref 150–450)
POTASSIUM SERPL-SCNC: 3.5 MMOL/L (ref 3.5–5.3)
PROT SERPL-MCNC: 7.2 G/DL (ref 6.4–8.2)
PROTHROMBIN TIME: 11.1 SECONDS (ref 9.8–12.4)
RBC # BLD AUTO: 4.29 X10*6/UL (ref 4–5.2)
SODIUM SERPL-SCNC: 139 MMOL/L (ref 136–145)
TIBC SERPL-MCNC: 487 UG/DL (ref 240–445)
UIBC SERPL-MCNC: 378 UG/DL (ref 110–370)
WBC # BLD AUTO: 3.7 X10*3/UL (ref 4.4–11.3)

## 2025-03-25 PROCEDURE — 85025 COMPLETE CBC W/AUTO DIFF WBC: CPT

## 2025-03-25 PROCEDURE — 3080F DIAST BP >= 90 MM HG: CPT | Performed by: INTERNAL MEDICINE

## 2025-03-25 PROCEDURE — 83540 ASSAY OF IRON: CPT

## 2025-03-25 PROCEDURE — 80053 COMPREHEN METABOLIC PANEL: CPT

## 2025-03-25 PROCEDURE — 36415 COLL VENOUS BLD VENIPUNCTURE: CPT | Performed by: INTERNAL MEDICINE

## 2025-03-25 PROCEDURE — 85652 RBC SED RATE AUTOMATED: CPT

## 2025-03-25 PROCEDURE — 82746 ASSAY OF FOLIC ACID SERUM: CPT

## 2025-03-25 PROCEDURE — 82728 ASSAY OF FERRITIN: CPT

## 2025-03-25 PROCEDURE — 85610 PROTHROMBIN TIME: CPT

## 2025-03-25 PROCEDURE — 99205 OFFICE O/P NEW HI 60 MIN: CPT | Performed by: INTERNAL MEDICINE

## 2025-03-25 PROCEDURE — 82607 VITAMIN B-12: CPT

## 2025-03-25 PROCEDURE — 99215 OFFICE O/P EST HI 40 MIN: CPT | Performed by: INTERNAL MEDICINE

## 2025-03-25 PROCEDURE — 3008F BODY MASS INDEX DOCD: CPT | Performed by: INTERNAL MEDICINE

## 2025-03-25 PROCEDURE — 76700 US EXAM ABDOM COMPLETE: CPT

## 2025-03-25 PROCEDURE — 36415 COLL VENOUS BLD VENIPUNCTURE: CPT

## 2025-03-25 PROCEDURE — 76700 US EXAM ABDOM COMPLETE: CPT | Performed by: RADIOLOGY

## 2025-03-25 PROCEDURE — 83615 LACTATE (LD) (LDH) ENZYME: CPT

## 2025-03-25 PROCEDURE — 85730 THROMBOPLASTIN TIME PARTIAL: CPT

## 2025-03-25 PROCEDURE — 83550 IRON BINDING TEST: CPT

## 2025-03-25 PROCEDURE — 3077F SYST BP >= 140 MM HG: CPT | Performed by: INTERNAL MEDICINE

## 2025-03-25 ASSESSMENT — ENCOUNTER SYMPTOMS
DEPRESSION: 0
GASTROINTESTINAL NEGATIVE: 1
BACK PAIN: 1
CONSTITUTIONAL NEGATIVE: 1
OCCASIONAL FEELINGS OF UNSTEADINESS: 0
RESPIRATORY NEGATIVE: 1
LOSS OF SENSATION IN FEET: 0
CARDIOVASCULAR NEGATIVE: 1

## 2025-03-25 ASSESSMENT — PATIENT HEALTH QUESTIONNAIRE - PHQ9
1. LITTLE INTEREST OR PLEASURE IN DOING THINGS: NOT AT ALL
2. FEELING DOWN, DEPRESSED OR HOPELESS: NOT AT ALL
SUM OF ALL RESPONSES TO PHQ9 QUESTIONS 1 AND 2: 0

## 2025-03-25 ASSESSMENT — LIFESTYLE VARIABLES
HOW OFTEN DO YOU HAVE SIX OR MORE DRINKS ON ONE OCCASION: NEVER
HOW MANY STANDARD DRINKS CONTAINING ALCOHOL DO YOU HAVE ON A TYPICAL DAY: 3 OR 4
HOW OFTEN DO YOU HAVE A DRINK CONTAINING ALCOHOL: MONTHLY OR LESS
AUDIT-C TOTAL SCORE: 2
SKIP TO QUESTIONS 9-10: 0

## 2025-03-25 ASSESSMENT — COLUMBIA-SUICIDE SEVERITY RATING SCALE - C-SSRS
1. IN THE PAST MONTH, HAVE YOU WISHED YOU WERE DEAD OR WISHED YOU COULD GO TO SLEEP AND NOT WAKE UP?: NO
2. HAVE YOU ACTUALLY HAD ANY THOUGHTS OF KILLING YOURSELF?: NO
6. HAVE YOU EVER DONE ANYTHING, STARTED TO DO ANYTHING, OR PREPARED TO DO ANYTHING TO END YOUR LIFE?: NO

## 2025-03-25 ASSESSMENT — PAIN SCALES - GENERAL: PAINLEVEL_OUTOF10: 6

## 2025-03-25 NOTE — PROGRESS NOTES
"Patient ID: Nell Liao is a 54 y.o. female.  Referring Physician: Oksana Murray DO  [address]  [address]  [place] [id number]  Primary Care Provider: Oksana Murray DO  Referral Reason: Low PLT and WBC counts    Subjective:  Was referred for low PLT and WBC counts. Says she had low counts since youg age. Denies B symptoms. Denies excessive bleeding. Last period many years ago.   Had C?sections without bleeding complications.     Assessment/Plan:  ? Thrombocytopenia: This is mild at 100-120s. Chronic, since at leats 2020. No downward trend. HB is OK. WBC mildly decreased.   Mild thrombocytopenia could be due to many different etiologies, I.e. medications but we dont need to change her meds or add any supplements with this chronic mild thrombocytopenia. It should not affect her life in any way.   There is no sign/symptom to suggest an underlying hematological disorder. Will repeat labs today with smear. 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 We will also get US Abd to evaluate for HSM    Will talk on the phone in 4 weeks to go over the results. If all OK, no need for further work-up. Pt concurs.     Review Of Systems:  Review of Systems   Constitutional: Negative.    HENT:  Negative.     Respiratory: Negative.     Cardiovascular: Negative.    Gastrointestinal: Negative.    Musculoskeletal:  Positive for back pain.       Physical Exam:  BP (!) 196/99 (BP Location: Left arm, Patient Position: Sitting, BP Cuff Size: Large adult) Comment: \"I'm stressed and I haven't taken my pills in a couple of days  Pulse 75   Temp 36.5 °C (97.7 °F) (Temporal)   Resp 16   Ht 1.651 m (5' 5\")   Wt 108 kg (239 lb 1.6 oz)   SpO2 100%   BMI 39.79 kg/m²   BSA: 2.23 meters squared  Performance Status: Asymptomatic  Physical Exam  Constitutional:       Appearance: Normal appearance.   HENT:      Head: Normocephalic and atraumatic.   Eyes:      Pupils: Pupils are equal, round, and reactive to light. "   Cardiovascular:      Rate and Rhythm: Normal rate and regular rhythm.   Pulmonary:      Effort: Pulmonary effort is normal.   Abdominal:      General: Abdomen is flat.      Palpations: Abdomen is soft. There is no mass.   Musculoskeletal:      Right lower leg: No edema.      Left lower leg: No edema.   Lymphadenopathy:      Cervical: No cervical adenopathy.   Skin:     Coloration: Skin is not jaundiced.   Neurological:      General: No focal deficit present.      Mental Status: She is alert and oriented to person, place, and time.         Results:  Diagnostic Results   Lab Results   Component Value Date    WBC 3.8 (L) 10/23/2024    HGB 14.1 10/23/2024    HCT 41.5 10/23/2024    MCV 94 10/23/2024     (L) 10/23/2024     Lab Results   Component Value Date    CALCIUM 9.5 10/23/2024     10/23/2024    K 3.5 10/23/2024    CO2 29 10/23/2024     10/23/2024    BUN 9 10/23/2024    CREATININE 0.69 10/23/2024    ALT 25 10/23/2024    AST 25 10/23/2024       Current Outpatient Medications:     azelastine (Astelin) 137 mcg (0.1 %) nasal spray, Administer 2 sprays into affected nostril(s) 2 times a day., Disp: , Rfl:     cholecalciferol (Vitamin D3) 10 MCG (400 UNIT) tablet, Take 1 tablet (10 mcg) by mouth once daily., Disp: , Rfl:     dilTIAZem XR (Dilacor XR) 240 mg 24 hr capsule, TAKE 1 CAPSULE BY MOUTH ONCE DAILY., Disp: 90 capsule, Rfl: 3    hydroCHLOROthiazide (HYDRODiuril) 25 mg tablet, TAKE 1 TABLET BY MOUTH EVERY DAY, Disp: 90 tablet, Rfl: 3    losartan (Cozaar) 50 mg tablet, TAKE 1 TABLET BY MOUTH EVERY DAY, Disp: 90 tablet, Rfl: 3    polyethylene glycol-electrolytes (Golytely) 420 gram solution, Begin drinking first half of prep 6pm the night before procedure. Start second half 5 hours before procedure time., Disp: 4000 mL, Rfl: 0    cyanocobalamin (Vitamin B-12) 1,000 mcg/mL injection, Cyanocobalamin 1000 MCG/ML Injection Solution Refills: 0 (Patient not taking: Reported on 3/25/2025), Disp: , Rfl:       Past Surgical History:   Procedure Laterality Date    OTHER SURGICAL HISTORY  12/28/2021    Hernia repair    OTHER SURGICAL HISTORY  12/28/2021    Uterine myomectomy     No family history on file.   reports that she has quit smoking. Her smoking use included cigarettes. She has never used smokeless tobacco.  Social History     Socioeconomic History    Marital status: Single     Spouse name: Not on file    Number of children: Not on file    Years of education: Not on file    Highest education level: Not on file   Occupational History    Not on file   Tobacco Use    Smoking status: Former     Types: Cigarettes    Smokeless tobacco: Never   Substance and Sexual Activity    Alcohol use: Never    Drug use: Never    Sexual activity: Not on file   Other Topics Concern    Not on file   Social History Narrative    Not on file     Social Drivers of Health     Financial Resource Strain: Not on file   Food Insecurity: Not on file   Transportation Needs: Not on file   Physical Activity: Not on file   Stress: Not on file   Social Connections: Not on file   Intimate Partner Violence: Not on file   Housing Stability: Not on file       Diagnoses and all orders for this visit:  Thrombocytopenia (CMS-HCC)  -     Referral to Hematology and Oncology  -     CBC and Auto Differential; Future  -     Comprehensive Metabolic Panel; Future  -     Sedimentation Rate; Future  -     Lactate Dehydrogenase; Future  -     Ferritin; Future  -     Iron and TIBC; Future  -     Folate; Future  -     Vitamin B12; Future  -     Slide Request; Future  -     US abdomen complete; Future  -     Clinic Appointment Request; Future    Corby Arcos MD

## 2025-03-26 ENCOUNTER — TELEPHONE (OUTPATIENT)
Dept: HEMATOLOGY/ONCOLOGY | Facility: HOSPITAL | Age: 55
End: 2025-03-26
Payer: COMMERCIAL

## 2025-03-26 LAB
FOLATE SERPL-MCNC: 14.4 NG/ML
VIT B12 SERPL-MCNC: 447 PG/ML (ref 211–911)

## 2025-03-26 NOTE — TELEPHONE ENCOUNTER
Please let her know that her results are good and she does not have any significant blood issues as I had told her yesterday. Does not need follow-up with us  per Dr. Garza staff message.             Contacted patient, notified that her results are good and she does not have any significant blood issues. Also notified she does not need to follow up with Dr. Garza.

## 2025-04-23 ENCOUNTER — APPOINTMENT (OUTPATIENT)
Dept: HEMATOLOGY/ONCOLOGY | Facility: HOSPITAL | Age: 55
End: 2025-04-23
Payer: COMMERCIAL

## 2025-04-24 ENCOUNTER — TELEMEDICINE (OUTPATIENT)
Dept: HEMATOLOGY/ONCOLOGY | Facility: CLINIC | Age: 55
End: 2025-04-24
Payer: COMMERCIAL

## 2025-04-24 DIAGNOSIS — D69.6 THROMBOCYTOPENIA (CMS-HCC): ICD-10-CM

## 2025-04-24 PROCEDURE — 99214 OFFICE O/P EST MOD 30 MIN: CPT | Performed by: INTERNAL MEDICINE

## 2025-04-24 ASSESSMENT — ENCOUNTER SYMPTOMS
BACK PAIN: 1
GASTROINTESTINAL NEGATIVE: 1
RESPIRATORY NEGATIVE: 1
CARDIOVASCULAR NEGATIVE: 1
CONSTITUTIONAL NEGATIVE: 1

## 2025-05-01 ENCOUNTER — APPOINTMENT (OUTPATIENT)
Dept: ORTHOPEDIC SURGERY | Facility: CLINIC | Age: 55
End: 2025-05-01
Payer: COMMERCIAL

## 2025-05-08 ENCOUNTER — HOSPITAL ENCOUNTER (OUTPATIENT)
Dept: RADIOLOGY | Facility: CLINIC | Age: 55
Discharge: HOME | End: 2025-05-08
Payer: COMMERCIAL

## 2025-05-08 ENCOUNTER — APPOINTMENT (OUTPATIENT)
Dept: ORTHOPEDIC SURGERY | Facility: CLINIC | Age: 55
End: 2025-05-08
Payer: COMMERCIAL

## 2025-05-08 DIAGNOSIS — M17.0 PRIMARY OSTEOARTHRITIS OF BOTH KNEES: ICD-10-CM

## 2025-05-08 PROCEDURE — 73562 X-RAY EXAM OF KNEE 3: CPT | Mod: 50

## 2025-05-08 RX ORDER — METHYLPREDNISOLONE ACETATE 40 MG/ML
40 INJECTION, SUSPENSION INTRA-ARTICULAR; INTRALESIONAL; INTRAMUSCULAR; SOFT TISSUE
Status: COMPLETED | OUTPATIENT
Start: 2025-05-08 | End: 2025-05-08

## 2025-05-08 RX ORDER — LIDOCAINE HYDROCHLORIDE 20 MG/ML
2 INJECTION, SOLUTION INFILTRATION; PERINEURAL
Status: COMPLETED | OUTPATIENT
Start: 2025-05-08 | End: 2025-05-08

## 2025-05-08 RX ADMIN — LIDOCAINE HYDROCHLORIDE 2 ML: 20 INJECTION, SOLUTION INFILTRATION; PERINEURAL at 11:50

## 2025-05-08 RX ADMIN — METHYLPREDNISOLONE ACETATE 40 MG: 40 INJECTION, SUSPENSION INTRA-ARTICULAR; INTRALESIONAL; INTRAMUSCULAR; SOFT TISSUE at 11:50

## 2025-05-08 NOTE — PROGRESS NOTES
Bilateral knee pain last injection helped for a great deal of time  L Inj/Asp: bilateral knee on 5/8/2025 11:50 AM  Indications: pain  Details: 21 G needle, anteromedial approach  Medications (Right): 2 mL lidocaine 20 mg/mL (2 %); 40 mg methylPREDNISolone acetate 40 mg/mL  Medications (Left): 2 mL lidocaine 20 mg/mL (2 %); 40 mg methylPREDNISolone acetate 40 mg/mL  Procedure, treatment alternatives, risks and benefits explained, specific risks discussed. Consent was given by the patient. Immediately prior to procedure a time out was called to verify the correct patient, procedure, equipment, support staff and site/side marked as required. Patient was prepped and draped in the usual sterile fashion.

## 2025-05-12 ENCOUNTER — APPOINTMENT (OUTPATIENT)
Dept: PRIMARY CARE | Facility: CLINIC | Age: 55
End: 2025-05-12
Payer: COMMERCIAL

## 2025-06-25 ENCOUNTER — APPOINTMENT (OUTPATIENT)
Dept: OPHTHALMOLOGY | Facility: CLINIC | Age: 55
End: 2025-06-25
Payer: COMMERCIAL

## 2025-07-23 DIAGNOSIS — Z12.31 ENCOUNTER FOR SCREENING MAMMOGRAM FOR BREAST CANCER: ICD-10-CM
